# Patient Record
Sex: FEMALE | Race: WHITE | Employment: OTHER | URBAN - METROPOLITAN AREA
[De-identification: names, ages, dates, MRNs, and addresses within clinical notes are randomized per-mention and may not be internally consistent; named-entity substitution may affect disease eponyms.]

---

## 2018-11-19 ENCOUNTER — APPOINTMENT (OUTPATIENT)
Dept: GENERAL RADIOLOGY | Age: 83
End: 2018-11-19
Payer: MEDICARE

## 2018-11-19 ENCOUNTER — HOSPITAL ENCOUNTER (INPATIENT)
Age: 83
LOS: 10 days | Discharge: NURSING, REHAB OR LONG-TERM CARE | DRG: 025 | End: 2018-11-29
Attending: FAMILY MEDICINE | Admitting: INTERNAL MEDICINE
Payer: MEDICARE

## 2018-11-19 ENCOUNTER — HOSPITAL ENCOUNTER (EMERGENCY)
Age: 83
Discharge: ANOTHER HOSPITAL | End: 2018-11-19
Attending: EMERGENCY MEDICINE
Payer: MEDICARE

## 2018-11-19 ENCOUNTER — HOSPITAL ENCOUNTER (EMERGENCY)
Age: 83
DRG: 025 | End: 2018-11-19
Attending: FAMILY MEDICINE
Payer: MEDICARE

## 2018-11-19 ENCOUNTER — APPOINTMENT (OUTPATIENT)
Dept: CT IMAGING | Age: 83
End: 2018-11-19
Payer: MEDICARE

## 2018-11-19 VITALS
HEIGHT: 66 IN | OXYGEN SATURATION: 96 % | TEMPERATURE: 98.1 F | BODY MASS INDEX: 27.32 KG/M2 | SYSTOLIC BLOOD PRESSURE: 156 MMHG | WEIGHT: 170 LBS | HEART RATE: 90 BPM | RESPIRATION RATE: 20 BRPM | DIASTOLIC BLOOD PRESSURE: 95 MMHG

## 2018-11-19 DIAGNOSIS — S06.5XAA BILATERAL SUBDURAL HEMATOMAS: ICD-10-CM

## 2018-11-19 DIAGNOSIS — R41.82 ALTERED MENTAL STATUS, UNSPECIFIED ALTERED MENTAL STATUS TYPE: Primary | ICD-10-CM

## 2018-11-19 DIAGNOSIS — R29.6 FREQUENT FALLS: ICD-10-CM

## 2018-11-19 DIAGNOSIS — S06.5XAA SUBDURAL HEMATOMA: Primary | ICD-10-CM

## 2018-11-19 LAB
A/G RATIO: 1.4 (ref 1.1–2.2)
ALBUMIN SERPL-MCNC: 4 G/DL (ref 3.4–5)
ALP BLD-CCNC: 134 U/L (ref 40–129)
ALT SERPL-CCNC: 21 U/L (ref 10–40)
ANION GAP SERPL CALCULATED.3IONS-SCNC: 10 MMOL/L (ref 3–16)
AST SERPL-CCNC: 31 U/L (ref 15–37)
BASOPHILS ABSOLUTE: 0.1 K/UL (ref 0–0.2)
BASOPHILS RELATIVE PERCENT: 0.9 %
BILIRUB SERPL-MCNC: 0.7 MG/DL (ref 0–1)
BILIRUBIN URINE: NEGATIVE
BLOOD, URINE: NEGATIVE
BUN BLDV-MCNC: 22 MG/DL (ref 7–20)
CALCIUM SERPL-MCNC: 10 MG/DL (ref 8.3–10.6)
CHLORIDE BLD-SCNC: 101 MMOL/L (ref 99–110)
CLARITY: CLEAR
CO2: 32 MMOL/L (ref 21–32)
COLOR: YELLOW
CREAT SERPL-MCNC: 0.9 MG/DL (ref 0.6–1.2)
EOSINOPHILS ABSOLUTE: 0.1 K/UL (ref 0–0.6)
EOSINOPHILS RELATIVE PERCENT: 1.8 %
GFR AFRICAN AMERICAN: >60
GFR NON-AFRICAN AMERICAN: 59
GLOBULIN: 2.9 G/DL
GLUCOSE BLD-MCNC: 102 MG/DL (ref 70–99)
GLUCOSE URINE: NEGATIVE MG/DL
HCT VFR BLD CALC: 41.8 % (ref 36–48)
HEMOGLOBIN: 13.6 G/DL (ref 12–16)
INR BLD: 1.18 (ref 0.86–1.14)
KETONES, URINE: NEGATIVE MG/DL
LACTIC ACID, SEPSIS: 1.3 MMOL/L (ref 0.4–1.9)
LEUKOCYTE ESTERASE, URINE: NEGATIVE
LYMPHOCYTES ABSOLUTE: 1.2 K/UL (ref 1–5.1)
LYMPHOCYTES RELATIVE PERCENT: 15.5 %
MCH RBC QN AUTO: 28.7 PG (ref 26–34)
MCHC RBC AUTO-ENTMCNC: 32.6 G/DL (ref 31–36)
MCV RBC AUTO: 88.1 FL (ref 80–100)
MICROSCOPIC EXAMINATION: NORMAL
MONOCYTES ABSOLUTE: 0.8 K/UL (ref 0–1.3)
MONOCYTES RELATIVE PERCENT: 10.7 %
NEUTROPHILS ABSOLUTE: 5.4 K/UL (ref 1.7–7.7)
NEUTROPHILS RELATIVE PERCENT: 71.1 %
NITRITE, URINE: NEGATIVE
PDW BLD-RTO: 17.2 % (ref 12.4–15.4)
PH UA: 6.5
PLATELET # BLD: 234 K/UL (ref 135–450)
PMV BLD AUTO: 8.5 FL (ref 5–10.5)
POTASSIUM SERPL-SCNC: 4.2 MMOL/L (ref 3.5–5.1)
PRO-BNP: 445 PG/ML (ref 0–449)
PROTEIN UA: NEGATIVE MG/DL
PROTHROMBIN TIME: 13.5 SEC (ref 9.8–13)
RBC # BLD: 4.74 M/UL (ref 4–5.2)
SODIUM BLD-SCNC: 143 MMOL/L (ref 136–145)
SPECIFIC GRAVITY UA: 1.01
TOTAL PROTEIN: 6.9 G/DL (ref 6.4–8.2)
TROPONIN: <0.01 NG/ML
URINE REFLEX TO CULTURE: NORMAL
URINE TYPE: NORMAL
UROBILINOGEN, URINE: 0.2 E.U./DL
WBC # BLD: 7.6 K/UL (ref 4–11)

## 2018-11-19 PROCEDURE — 83880 ASSAY OF NATRIURETIC PEPTIDE: CPT

## 2018-11-19 PROCEDURE — 36415 COLL VENOUS BLD VENIPUNCTURE: CPT

## 2018-11-19 PROCEDURE — 80053 COMPREHEN METABOLIC PANEL: CPT

## 2018-11-19 PROCEDURE — 87040 BLOOD CULTURE FOR BACTERIA: CPT

## 2018-11-19 PROCEDURE — 81003 URINALYSIS AUTO W/O SCOPE: CPT

## 2018-11-19 PROCEDURE — 51702 INSERT TEMP BLADDER CATH: CPT

## 2018-11-19 PROCEDURE — 99291 CRITICAL CARE FIRST HOUR: CPT

## 2018-11-19 PROCEDURE — 84484 ASSAY OF TROPONIN QUANT: CPT

## 2018-11-19 PROCEDURE — 2000000000 HC ICU R&B

## 2018-11-19 PROCEDURE — 85025 COMPLETE CBC W/AUTO DIFF WBC: CPT

## 2018-11-19 PROCEDURE — 71046 X-RAY EXAM CHEST 2 VIEWS: CPT

## 2018-11-19 PROCEDURE — 83605 ASSAY OF LACTIC ACID: CPT

## 2018-11-19 PROCEDURE — 93005 ELECTROCARDIOGRAM TRACING: CPT | Performed by: NURSE PRACTITIONER

## 2018-11-19 PROCEDURE — 85610 PROTHROMBIN TIME: CPT

## 2018-11-19 PROCEDURE — 70450 CT HEAD/BRAIN W/O DYE: CPT

## 2018-11-19 RX ORDER — M-VIT,TX,IRON,MINS/CALC/FOLIC 27MG-0.4MG
1 TABLET ORAL DAILY
COMMUNITY
End: 2019-07-29 | Stop reason: CLARIF

## 2018-11-19 RX ORDER — METOPROLOL SUCCINATE 50 MG/1
50 TABLET, EXTENDED RELEASE ORAL DAILY
Status: ON HOLD | COMMUNITY
End: 2018-11-29 | Stop reason: HOSPADM

## 2018-11-19 RX ORDER — FUROSEMIDE 20 MG/1
20 TABLET ORAL DAILY
Status: ON HOLD | COMMUNITY
End: 2018-11-29 | Stop reason: HOSPADM

## 2018-11-19 RX ORDER — LEVOTHYROXINE SODIUM 0.15 MG/1
150 TABLET ORAL DAILY
COMMUNITY
End: 2019-07-29 | Stop reason: CLARIF

## 2018-11-19 RX ORDER — NYSTATIN 100000 [USP'U]/G
POWDER TOPICAL 4 TIMES DAILY
Status: ON HOLD | COMMUNITY
End: 2019-08-01 | Stop reason: HOSPADM

## 2018-11-19 RX ORDER — PHENOL 1.4 %
2.5 AEROSOL, SPRAY (ML) MUCOUS MEMBRANE DAILY
COMMUNITY
End: 2019-07-29 | Stop reason: CLARIF

## 2018-11-19 RX ORDER — ERTAPENEM 1 G/1
1000 INJECTION, POWDER, LYOPHILIZED, FOR SOLUTION INTRAMUSCULAR; INTRAVENOUS EVERY 24 HOURS
Status: ON HOLD | COMMUNITY
End: 2018-11-29 | Stop reason: HOSPADM

## 2018-11-19 RX ORDER — CLOPIDOGREL BISULFATE 75 MG/1
75 TABLET ORAL DAILY
Status: ON HOLD | COMMUNITY
End: 2018-11-29 | Stop reason: HOSPADM

## 2018-11-20 ENCOUNTER — APPOINTMENT (OUTPATIENT)
Dept: GENERAL RADIOLOGY | Age: 83
DRG: 025 | End: 2018-11-20
Attending: FAMILY MEDICINE
Payer: MEDICARE

## 2018-11-20 ENCOUNTER — APPOINTMENT (OUTPATIENT)
Dept: CT IMAGING | Age: 83
DRG: 025 | End: 2018-11-20
Attending: FAMILY MEDICINE
Payer: MEDICARE

## 2018-11-20 LAB
ABO/RH: NORMAL
ALBUMIN SERPL-MCNC: 3.7 G/DL (ref 3.4–5)
ANION GAP SERPL CALCULATED.3IONS-SCNC: 12 MMOL/L (ref 3–16)
ANTIBODY SCREEN: NORMAL
BASOPHILS ABSOLUTE: 0.1 K/UL (ref 0–0.2)
BASOPHILS RELATIVE PERCENT: 0.9 %
BLOOD BANK DISPENSE STATUS: NORMAL
BLOOD BANK PRODUCT CODE: NORMAL
BPU ID: NORMAL
BUN BLDV-MCNC: 21 MG/DL (ref 7–20)
CALCIUM SERPL-MCNC: 9.2 MG/DL (ref 8.3–10.6)
CHLORIDE BLD-SCNC: 104 MMOL/L (ref 99–110)
CO2: 26 MMOL/L (ref 21–32)
COLLAGEN ADENOSINE-5'-DIPHOSPHATE (ADP) TIME: 107 SEC (ref 56–110)
COLLAGEN EPINEPHRINE TIME: 227 SEC (ref 86–194)
CREAT SERPL-MCNC: 0.7 MG/DL (ref 0.6–1.2)
DESCRIPTION BLOOD BANK: NORMAL
EOSINOPHILS ABSOLUTE: 0.1 K/UL (ref 0–0.6)
EOSINOPHILS RELATIVE PERCENT: 1.3 %
GFR AFRICAN AMERICAN: >60
GFR NON-AFRICAN AMERICAN: >60
GLUCOSE BLD-MCNC: 115 MG/DL (ref 70–99)
HCT VFR BLD CALC: 38.5 % (ref 36–48)
HEMOGLOBIN: 12.6 G/DL (ref 12–16)
INR BLD: 1.17 (ref 0.86–1.14)
LYMPHOCYTES ABSOLUTE: 1 K/UL (ref 1–5.1)
LYMPHOCYTES RELATIVE PERCENT: 15.2 %
MAGNESIUM: 2 MG/DL (ref 1.8–2.4)
MCH RBC QN AUTO: 28.5 PG (ref 26–34)
MCHC RBC AUTO-ENTMCNC: 32.6 G/DL (ref 31–36)
MCV RBC AUTO: 87.2 FL (ref 80–100)
MONOCYTES ABSOLUTE: 0.6 K/UL (ref 0–1.3)
MONOCYTES RELATIVE PERCENT: 9.5 %
NEUTROPHILS ABSOLUTE: 4.8 K/UL (ref 1.7–7.7)
NEUTROPHILS RELATIVE PERCENT: 73.1 %
PDW BLD-RTO: 17.2 % (ref 12.4–15.4)
PHOSPHORUS: 2.4 MG/DL (ref 2.5–4.9)
PLATELET # BLD: 235 K/UL (ref 135–450)
PLATELET FUNCTION INTERPRETATION: ABNORMAL
PMV BLD AUTO: 8.2 FL (ref 5–10.5)
POTASSIUM SERPL-SCNC: 4.3 MMOL/L (ref 3.5–5.1)
PROTHROMBIN TIME: 13.3 SEC (ref 9.8–13)
RBC # BLD: 4.42 M/UL (ref 4–5.2)
SODIUM BLD-SCNC: 142 MMOL/L (ref 136–145)
WBC # BLD: 6.6 K/UL (ref 4–11)

## 2018-11-20 PROCEDURE — 36415 COLL VENOUS BLD VENIPUNCTURE: CPT

## 2018-11-20 PROCEDURE — 83735 ASSAY OF MAGNESIUM: CPT

## 2018-11-20 PROCEDURE — 86850 RBC ANTIBODY SCREEN: CPT

## 2018-11-20 PROCEDURE — 85576 BLOOD PLATELET AGGREGATION: CPT

## 2018-11-20 PROCEDURE — 80069 RENAL FUNCTION PANEL: CPT

## 2018-11-20 PROCEDURE — 2000000000 HC ICU R&B

## 2018-11-20 PROCEDURE — 70450 CT HEAD/BRAIN W/O DYE: CPT

## 2018-11-20 PROCEDURE — 86901 BLOOD TYPING SEROLOGIC RH(D): CPT

## 2018-11-20 PROCEDURE — 86900 BLOOD TYPING SEROLOGIC ABO: CPT

## 2018-11-20 PROCEDURE — 6360000002 HC RX W HCPCS: Performed by: INTERNAL MEDICINE

## 2018-11-20 PROCEDURE — 6360000002 HC RX W HCPCS: Performed by: NURSE PRACTITIONER

## 2018-11-20 PROCEDURE — 85610 PROTHROMBIN TIME: CPT

## 2018-11-20 PROCEDURE — 36430 TRANSFUSION BLD/BLD COMPNT: CPT

## 2018-11-20 PROCEDURE — 93010 ELECTROCARDIOGRAM REPORT: CPT | Performed by: INTERNAL MEDICINE

## 2018-11-20 PROCEDURE — 6360000002 HC RX W HCPCS: Performed by: STUDENT IN AN ORGANIZED HEALTH CARE EDUCATION/TRAINING PROGRAM

## 2018-11-20 PROCEDURE — 87040 BLOOD CULTURE FOR BACTERIA: CPT

## 2018-11-20 PROCEDURE — 85025 COMPLETE CBC W/AUTO DIFF WBC: CPT

## 2018-11-20 PROCEDURE — 71045 X-RAY EXAM CHEST 1 VIEW: CPT

## 2018-11-20 PROCEDURE — 2580000003 HC RX 258: Performed by: STUDENT IN AN ORGANIZED HEALTH CARE EDUCATION/TRAINING PROGRAM

## 2018-11-20 PROCEDURE — P9035 PLATELET PHERES LEUKOREDUCED: HCPCS

## 2018-11-20 PROCEDURE — 6370000000 HC RX 637 (ALT 250 FOR IP): Performed by: STUDENT IN AN ORGANIZED HEALTH CARE EDUCATION/TRAINING PROGRAM

## 2018-11-20 RX ORDER — ONDANSETRON 2 MG/ML
4 INJECTION INTRAMUSCULAR; INTRAVENOUS EVERY 6 HOURS PRN
Status: DISCONTINUED | OUTPATIENT
Start: 2018-11-20 | End: 2018-11-29 | Stop reason: HOSPADM

## 2018-11-20 RX ORDER — SODIUM CHLORIDE 0.9 % (FLUSH) 0.9 %
10 SYRINGE (ML) INJECTION EVERY 12 HOURS SCHEDULED
Status: DISCONTINUED | OUTPATIENT
Start: 2018-11-20 | End: 2018-11-20

## 2018-11-20 RX ORDER — SODIUM CHLORIDE AND POTASSIUM CHLORIDE .9; .15 G/100ML; G/100ML
SOLUTION INTRAVENOUS CONTINUOUS
Status: DISCONTINUED | OUTPATIENT
Start: 2018-11-20 | End: 2018-11-20

## 2018-11-20 RX ORDER — SODIUM CHLORIDE 9 MG/ML
INJECTION, SOLUTION INTRAVENOUS CONTINUOUS
Status: DISCONTINUED | OUTPATIENT
Start: 2018-11-20 | End: 2018-11-22

## 2018-11-20 RX ORDER — HYDROCHLOROTHIAZIDE 25 MG/1
25 TABLET ORAL DAILY
Status: DISCONTINUED | OUTPATIENT
Start: 2018-11-20 | End: 2018-11-20

## 2018-11-20 RX ORDER — MAGNESIUM SULFATE 1 G/100ML
1 INJECTION INTRAVENOUS PRN
Status: DISCONTINUED | OUTPATIENT
Start: 2018-11-20 | End: 2018-11-21

## 2018-11-20 RX ORDER — 0.9 % SODIUM CHLORIDE 0.9 %
250 INTRAVENOUS SOLUTION INTRAVENOUS ONCE
Status: COMPLETED | OUTPATIENT
Start: 2018-11-20 | End: 2018-11-20

## 2018-11-20 RX ORDER — METOPROLOL SUCCINATE 50 MG/1
50 TABLET, EXTENDED RELEASE ORAL DAILY
Status: DISCONTINUED | OUTPATIENT
Start: 2018-11-20 | End: 2018-11-22

## 2018-11-20 RX ORDER — LIDOCAINE HYDROCHLORIDE 10 MG/ML
5 INJECTION, SOLUTION EPIDURAL; INFILTRATION; INTRACAUDAL; PERINEURAL ONCE
Status: DISCONTINUED | OUTPATIENT
Start: 2018-11-20 | End: 2018-11-21

## 2018-11-20 RX ORDER — ACETAMINOPHEN 325 MG/1
650 TABLET ORAL EVERY 4 HOURS PRN
Status: DISCONTINUED | OUTPATIENT
Start: 2018-11-20 | End: 2018-11-29 | Stop reason: HOSPADM

## 2018-11-20 RX ORDER — SODIUM CHLORIDE 0.9 % (FLUSH) 0.9 %
10 SYRINGE (ML) INJECTION PRN
Status: DISCONTINUED | OUTPATIENT
Start: 2018-11-20 | End: 2018-11-29 | Stop reason: HOSPADM

## 2018-11-20 RX ORDER — LEVOTHYROXINE SODIUM 0.15 MG/1
150 TABLET ORAL DAILY
Status: DISCONTINUED | OUTPATIENT
Start: 2018-11-20 | End: 2018-11-29 | Stop reason: HOSPADM

## 2018-11-20 RX ORDER — ATORVASTATIN CALCIUM 20 MG/1
20 TABLET, FILM COATED ORAL DAILY
Status: DISCONTINUED | OUTPATIENT
Start: 2018-11-20 | End: 2018-11-29 | Stop reason: HOSPADM

## 2018-11-20 RX ORDER — SODIUM CHLORIDE 0.9 % (FLUSH) 0.9 %
10 SYRINGE (ML) INJECTION EVERY 12 HOURS SCHEDULED
Status: DISCONTINUED | OUTPATIENT
Start: 2018-11-20 | End: 2018-11-29 | Stop reason: HOSPADM

## 2018-11-20 RX ORDER — POTASSIUM CHLORIDE AND SODIUM CHLORIDE 450; 150 MG/100ML; MG/100ML
INJECTION, SOLUTION INTRAVENOUS CONTINUOUS
Status: DISCONTINUED | OUTPATIENT
Start: 2018-11-20 | End: 2018-11-20

## 2018-11-20 RX ORDER — SODIUM CHLORIDE 0.9 % (FLUSH) 0.9 %
10 SYRINGE (ML) INJECTION PRN
Status: DISCONTINUED | OUTPATIENT
Start: 2018-11-20 | End: 2018-11-20

## 2018-11-20 RX ADMIN — LEVOTHYROXINE SODIUM 150 MCG: 150 TABLET ORAL at 06:40

## 2018-11-20 RX ADMIN — DESMOPRESSIN ACETATE 23.12 MCG: 4 INJECTION INTRAVENOUS at 02:49

## 2018-11-20 RX ADMIN — POTASSIUM CHLORIDE AND SODIUM CHLORIDE: 450; 150 INJECTION, SOLUTION INTRAVENOUS at 12:39

## 2018-11-20 RX ADMIN — SODIUM CHLORIDE 250 ML: 9 INJECTION, SOLUTION INTRAVENOUS at 05:28

## 2018-11-20 RX ADMIN — SODIUM CHLORIDE: 9 INJECTION, SOLUTION INTRAVENOUS at 23:23

## 2018-11-20 RX ADMIN — POTASSIUM CHLORIDE AND SODIUM CHLORIDE: 900; 150 INJECTION, SOLUTION INTRAVENOUS at 16:44

## 2018-11-20 ASSESSMENT — ENCOUNTER SYMPTOMS
PHOTOPHOBIA: 0
GASTROINTESTINAL NEGATIVE: 1
RESPIRATORY NEGATIVE: 1

## 2018-11-20 ASSESSMENT — PAIN DESCRIPTION - PROGRESSION
CLINICAL_PROGRESSION: NOT CHANGED

## 2018-11-20 ASSESSMENT — PAIN SCALES - GENERAL
PAINLEVEL_OUTOF10: 0
PAINLEVEL_OUTOF10: 5

## 2018-11-20 ASSESSMENT — PAIN DESCRIPTION - LOCATION: LOCATION: HEAD

## 2018-11-20 ASSESSMENT — PAIN DESCRIPTION - PAIN TYPE: TYPE: ACUTE PAIN

## 2018-11-20 ASSESSMENT — PAIN DESCRIPTION - ONSET: ONSET: ON-GOING

## 2018-11-20 ASSESSMENT — PAIN DESCRIPTION - DESCRIPTORS: DESCRIPTORS: CONSTANT

## 2018-11-20 NOTE — PROGRESS NOTES
Patient a difficult stick. Unable to obtain all of ordered blood work.  Discussed with ICU residents who will attempt

## 2018-11-20 NOTE — ED NOTES
Pt and daughter updated on the plan of care. Daughter leaving for night but can be reached at [phone].      Brunilda Vaughan RN  11/19/18 2392

## 2018-11-20 NOTE — CONSULTS
bilaterally  XII: Tongue midline     Musculoskeletal:   Gait: Not tested   Assist devices: None   Tone: Normal  Motor strength: Pt reports chronic LLE weakness    Right  Left      Right  Left    Deltoid  5 5   Hip Flex  5 4   Biceps  5 5   Knee Extensors  5 4   Triceps  5 5   Knee Flexors  5 4   Wrist Ext  5 5   Ankle Dorsiflex. 5 4   Wrist Flex  5 5   Ankle Plantarflex. 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 Approximately 5 mm of left-to-right shift. Neurosurgical evaluation is recommended.     Ct Head Without Contrast  Result Date: 11/20/2018  1. Reidentified large bilateral subdural fluid collections along both cerebral convexities as well as the bilateral tentorial leaflets and both sides of the falx cerebri. These again measure up to approximately 3.3 cm on the left and 2.9 cm on the right. Exact comparison is difficult due to differences in angulation. There is a component of acute and subacute hemorrhage although the chronic components could represent chronic subdural hematomas, effusions, or hygromas. These collections result in marked compression of the bilateral cerebral hemispheres and unchanged 5 mm rightward midline shift. 2.  Reidentified right frontal approach ventriculostomy catheter with tip terminating in the right frontal horn.   The ventricles are unchanged in size and nondilated.       Xr Shunt Series Placement (<4 Views)  Result Date: 11/20/2018  Right frontal approach  shunt catheter tubing is intact.        Labs:      Recent Labs      11/19/18   1655   WBC  7.6   HGB  13.6   HCT  41.8   PLT  234             Recent Labs      11/19/18   1655   NA  143   K  4.2   CL  101   CO2  32   BUN  22*   CREATININE  0.9   GLUCOSE  102*   CALCIUM  10.0             Recent Labs      11/20/18   0305   PROTIME  13.3*   INR  1.17*

## 2018-11-20 NOTE — PLAN OF CARE
Problem: Falls - Risk of:  Goal: Will remain free from falls  Will remain free from falls   Outcome: Ongoing  Pt is a fall risk. Fall risk protocol in place. See Jenny Trujillo Fall Score. Pt bed is in low position, bed alarm is on, side rails up, fall risk bracelet applied. , non-skid footwear in use. Patient/family educated on fall risk protocol, instructed to call for assistance when needed and belongings are in reach. assistance. Will continue with hourly rounds for po intake, pain needs, toileting and repositioning as needed. Will continue to monitor for needs. Problem: Risk for Impaired Skin Integrity  Goal: Tissue integrity - skin and mucous membranes  Structural intactness and normal physiological function of skin and  mucous membranes. Outcome: Ongoing  Pt at risk for skin breakdown. See Artur score. Pt remains on bedrest. Unable to reposition self in bed. Heels elevated off bed. Sacral heart mepilex intact to protect,  site inspected and intact underneath. Will continue to turn and reposition patient every two hours and as needed. Will continue to keep patient clean and dry, applying skin care cream as needed. Pillows used for repositioning q2hs. Will continue to monitor and assess for skin breakdown.

## 2018-11-20 NOTE — H&P
Hereditary and idiopathic neuropathy     Hydrocephalus     Hyperlipidemia     Hypertension     Localized edema     Muscle weakness (generalized)     Syncope and collapse     Thyroid disease     Unspecified kidney failure     UTI (urinary tract infection)        Past Surgical History:   Procedure Laterality Date    CHOLECYSTECTOMY      CSF SHUNT      HYSTERECTOMY         SocialHistory:   The patient lives at    Alcohol:  Illicit drugs: no use  Tobacco:      Family History:  No family history on file. MEDICATIONS:     No current facility-administered medications on file prior to encounter. Current Outpatient Prescriptions on File Prior to Encounter   Medication Sig Dispense Refill    aspirin 81 MG tablet Take 81 mg by mouth daily      calcium carbonate 600 MG TABS tablet Take 2.5 tablets by mouth daily      furosemide (LASIX) 20 MG tablet Take 20 mg by mouth daily      levothyroxine (SYNTHROID) 150 MCG tablet Take 150 mcg by mouth Daily      Multiple Vitamins-Minerals (THERAPEUTIC MULTIVITAMIN-MINERALS) tablet Take 1 tablet by mouth daily      hydrochlorothiazide (HYDRODIURIL) 25 MG tablet Take 25 mg by mouth daily.  clopidogrel (PLAVIX) 75 MG tablet Take 75 mg by mouth daily      ertapenem (INVANZ) 1 GM injection Inject 1,000 mg into the muscle every 24 hours Until 11/23/2018 for uti      metoprolol succinate (TOPROL XL) 50 MG extended release tablet Take 50 mg by mouth daily      Mirabegron ER 25 MG TB24 Take 25 mg by mouth daily      nystatin (MYCOSTATIN) 908780 UNIT/GM powder Apply topically 4 times daily Apply topically 4 times daily.  atorvastatin (LIPITOR) 20 MG tablet Take 20 mg by mouth daily. Scheduled Meds:   Continuous Infusions:  PRN Meds:    Allergies: No Known Allergies    REVIEW OF SYSTEMS:       History obtained from the patient, daughter and [facility]    Review of Systems   Constitutional: Negative. HENT: Negative.     Eyes: Negative for photophobia and visual disturbance. Respiratory: Negative. Cardiovascular: Negative. Gastrointestinal: Negative. Musculoskeletal: Positive for gait problem. Skin: Negative. Neurological: Positive for headaches. Negative for tremors, seizures, speech difficulty and numbness. PHYSICAL EXAM:       Vitals: /88   Pulse 85   Temp 97.6 °F (36.4 °C) (Axillary)   Resp 16   SpO2 97%     I/O:  No intake or output data in the 24 hours ending 11/20/18 0016  No intake/output data recorded. No intake/output data recorded. Physical Examination:     Physical Exam   Constitutional: She appears well-developed and well-nourished. No distress. HENT:   Head: Normocephalic and atraumatic. Eyes: Pupils are equal, round, and reactive to light. EOM are normal.   Neck: Normal range of motion. Cardiovascular: Normal rate. Irregularly irregular rhythm    Pulmonary/Chest: Effort normal and breath sounds normal. No respiratory distress. Abdominal: Soft. Bowel sounds are normal. She exhibits no distension. There is no tenderness. Musculoskeletal: Normal range of motion. Neurological:   Alert and oriented to person, place and month. No focal neurological deficits noted on exam. Was able to follow commands. 5/5 strength of upper and lower extremities. Skin: Skin is warm and dry. Psychiatric: She has a normal mood and affect. Access:   -Central Access Day #: 0                          -Peripheral Access Day#: 1  -Arterial line Day#: 0                                Salcedo Day#: 0  NGT Day#: 0                                         ETT Day#: 0  Vent Settings:   No results for input(s): PHART, HCU9UTP, PO2ART in the last 72 hours.         DATA:       Labs:  CBC:   Recent Labs      11/19/18   1655   WBC  7.6   HGB  13.6   HCT  41.8   PLT  234       BMP:   Recent Labs      11/19/18   1655   NA  143   K  4.2   CL  101   CO2  32   BUN  22*   CREATININE  0.9   GLUCOSE  102*     LFT's:   Recent Desmopressin and 5 units of platelets in setting of ASA and Plavix use   - Daily CBC, RFP and INR   - Fall precautions in place   - PICC team consulted     #AMS- likely 2/2 AoC SDH. Mentation seems to be improving as patient is alert and oriented to person, place, and month. She has no fever, no leukocytosis and her UA was negative making infectious etiology unlikely.    - See plan above     #Normal pressure hydrocephalus- Patient with history of NPH, underwent shunt placement two years ago due to gait instability  - Neurosurge following    #HTN- Stable  - Maintain SBP < 160  - Cont home meds   HCTZ 25mg daily   Toprol XL 50mg daily     #HLD  - Cont home lipitor 20mg daily    #pA-Fib- Patient's EKG on presentation indicated A-Fib; however rate controlled into the 70s   - Cont home Toprol XL 50mg daily  - Tele     #Hypothyroidism   - Cont home synthroid     #History of UTI- Patient started on daily Ivanz treatment (11/15) for treatment of UTI   - D/ C Ivanz        Code Status: Full  FEN: NPO  PPX:  Hold AC in setting of brain bleed   DISPO: ICU    This patient has been staffed and discussed with Sean Hinton MD.   -----------------------------  Bianca Bhatia MD  Internal Medicine, PGY-1  11/20/2018  12:16 AM

## 2018-11-21 LAB
ALBUMIN SERPL-MCNC: 3.4 G/DL (ref 3.4–5)
ANION GAP SERPL CALCULATED.3IONS-SCNC: 10 MMOL/L (ref 3–16)
BASOPHILS ABSOLUTE: 0.1 K/UL (ref 0–0.2)
BASOPHILS RELATIVE PERCENT: 0.9 %
BUN BLDV-MCNC: 21 MG/DL (ref 7–20)
CALCIUM SERPL-MCNC: 8.8 MG/DL (ref 8.3–10.6)
CHLORIDE BLD-SCNC: 105 MMOL/L (ref 99–110)
CO2: 24 MMOL/L (ref 21–32)
CREAT SERPL-MCNC: 0.5 MG/DL (ref 0.6–1.2)
EOSINOPHILS ABSOLUTE: 0.2 K/UL (ref 0–0.6)
EOSINOPHILS RELATIVE PERCENT: 2.4 %
GFR AFRICAN AMERICAN: >60
GFR NON-AFRICAN AMERICAN: >60
GLUCOSE BLD-MCNC: 97 MG/DL (ref 70–99)
HCT VFR BLD CALC: 37.6 % (ref 36–48)
HEMOGLOBIN: 12.4 G/DL (ref 12–16)
LYMPHOCYTES ABSOLUTE: 1.2 K/UL (ref 1–5.1)
LYMPHOCYTES RELATIVE PERCENT: 17.7 %
MAGNESIUM: 1.9 MG/DL (ref 1.8–2.4)
MCH RBC QN AUTO: 28.5 PG (ref 26–34)
MCHC RBC AUTO-ENTMCNC: 33 G/DL (ref 31–36)
MCV RBC AUTO: 86.3 FL (ref 80–100)
MONOCYTES ABSOLUTE: 0.7 K/UL (ref 0–1.3)
MONOCYTES RELATIVE PERCENT: 9.9 %
NEUTROPHILS ABSOLUTE: 4.7 K/UL (ref 1.7–7.7)
NEUTROPHILS RELATIVE PERCENT: 69.1 %
PDW BLD-RTO: 17.3 % (ref 12.4–15.4)
PHOSPHORUS: 2.4 MG/DL (ref 2.5–4.9)
PLATELET # BLD: 266 K/UL (ref 135–450)
PMV BLD AUTO: 8.7 FL (ref 5–10.5)
POTASSIUM SERPL-SCNC: 4.6 MMOL/L (ref 3.5–5.1)
RBC # BLD: 4.35 M/UL (ref 4–5.2)
SODIUM BLD-SCNC: 139 MMOL/L (ref 136–145)
WBC # BLD: 6.7 K/UL (ref 4–11)

## 2018-11-21 PROCEDURE — 97535 SELF CARE MNGMENT TRAINING: CPT

## 2018-11-21 PROCEDURE — 85025 COMPLETE CBC W/AUTO DIFF WBC: CPT

## 2018-11-21 PROCEDURE — 6360000002 HC RX W HCPCS: Performed by: NURSE PRACTITIONER

## 2018-11-21 PROCEDURE — 2580000003 HC RX 258: Performed by: STUDENT IN AN ORGANIZED HEALTH CARE EDUCATION/TRAINING PROGRAM

## 2018-11-21 PROCEDURE — 1200000000 HC SEMI PRIVATE

## 2018-11-21 PROCEDURE — G8996 SWALLOW CURRENT STATUS: HCPCS

## 2018-11-21 PROCEDURE — 83735 ASSAY OF MAGNESIUM: CPT

## 2018-11-21 PROCEDURE — G8987 SELF CARE CURRENT STATUS: HCPCS

## 2018-11-21 PROCEDURE — 80069 RENAL FUNCTION PANEL: CPT

## 2018-11-21 PROCEDURE — 97163 PT EVAL HIGH COMPLEX 45 MIN: CPT

## 2018-11-21 PROCEDURE — 6370000000 HC RX 637 (ALT 250 FOR IP): Performed by: STUDENT IN AN ORGANIZED HEALTH CARE EDUCATION/TRAINING PROGRAM

## 2018-11-21 PROCEDURE — G8978 MOBILITY CURRENT STATUS: HCPCS

## 2018-11-21 PROCEDURE — G8988 SELF CARE GOAL STATUS: HCPCS

## 2018-11-21 PROCEDURE — 97530 THERAPEUTIC ACTIVITIES: CPT

## 2018-11-21 PROCEDURE — 6370000000 HC RX 637 (ALT 250 FOR IP): Performed by: NURSE PRACTITIONER

## 2018-11-21 PROCEDURE — G8979 MOBILITY GOAL STATUS: HCPCS

## 2018-11-21 PROCEDURE — 97167 OT EVAL HIGH COMPLEX 60 MIN: CPT

## 2018-11-21 PROCEDURE — 92610 EVALUATE SWALLOWING FUNCTION: CPT

## 2018-11-21 PROCEDURE — G8997 SWALLOW GOAL STATUS: HCPCS

## 2018-11-21 RX ORDER — DEXAMETHASONE 4 MG/1
2 TABLET ORAL EVERY 12 HOURS SCHEDULED
Status: DISCONTINUED | OUTPATIENT
Start: 2018-11-21 | End: 2018-11-27

## 2018-11-21 RX ORDER — QUETIAPINE FUMARATE 25 MG/1
12.5 TABLET, FILM COATED ORAL NIGHTLY
Status: DISCONTINUED | OUTPATIENT
Start: 2018-11-21 | End: 2018-11-24

## 2018-11-21 RX ADMIN — Medication 10 ML: at 15:28

## 2018-11-21 RX ADMIN — LEVOTHYROXINE SODIUM 150 MCG: 150 TABLET ORAL at 08:43

## 2018-11-21 RX ADMIN — ATORVASTATIN CALCIUM 20 MG: 20 TABLET, FILM COATED ORAL at 08:43

## 2018-11-21 RX ADMIN — Medication 10 ML: at 21:00

## 2018-11-21 RX ADMIN — DEXAMETHASONE 2 MG: 4 TABLET ORAL at 08:44

## 2018-11-21 RX ADMIN — SODIUM CHLORIDE: 9 INJECTION, SOLUTION INTRAVENOUS at 15:04

## 2018-11-21 RX ADMIN — DEXAMETHASONE 2 MG: 4 TABLET ORAL at 21:00

## 2018-11-21 RX ADMIN — METOPROLOL SUCCINATE 50 MG: 50 TABLET, EXTENDED RELEASE ORAL at 08:43

## 2018-11-21 RX ADMIN — QUETIAPINE FUMARATE 12.5 MG: 25 TABLET ORAL at 21:00

## 2018-11-21 ASSESSMENT — PAIN SCALES - GENERAL
PAINLEVEL_OUTOF10: 0

## 2018-11-21 NOTE — DISCHARGE SUMMARY
carbonate 600 MG Tabs tablet     clopidogrel 75 MG tablet  Commonly known as:  PLAVIX     furosemide 20 MG tablet  Commonly known as:  LASIX     hydrochlorothiazide 25 MG tablet  Commonly known as:  HYDRODIURIL     INVANZ 1 GM injection  Generic drug:  ertapenem     levothyroxine 150 MCG tablet  Commonly known as:  SYNTHROID     LIPITOR 20 MG tablet  Generic drug:  atorvastatin     metoprolol succinate 50 MG extended release tablet  Commonly known as:  TOPROL XL     Mirabegron ER 25 MG Tb24     nystatin 890172 UNIT/GM powder  Commonly known as:  MYCOSTATIN     therapeutic multivitamin-minerals tablet          Activity: activity as tolerated  Diet: regular diet  Wound Care: keep wound clean and dry    Time Spent on discharge is more than 30 minutes    Signed:  Hugo Meyer MD   Internal Medicine  11/21/2018  Patient seen and examined, labs and imaging studies reviewed, agree with assessment and plan as outlined above. Continue with discharge planning asked to monitor for recurrence of symptoms or new symptoms including but not limited to chest pain shortness of breath nausea vomiting fevers or chills and seek immediate medical attention or call 911. Greater than 30 minutes spent on case on day of discharge.  Late entry on 11/30    Fermín Yanes MD FACP

## 2018-11-21 NOTE — PROGRESS NOTES
Hyperlipidemia, Syncope and Collapse, UTI, Julianne, CSF Shunt. Family / Caregiver Present: No  Pain Assessment  Patient Currently in Pain: Denies (no indications of pain)       Social/Functional History  Social/Functional History  Lives With: Alone (NOTE: pt poor historian and unable to provide information; phoned dtr, Fabio Lynch for PLOF information as below: )  Type of Home: Assisted living ([address])  Home Equipment: Rolling walker  Receives Help From:  (nurses monitor medication management)  ADL Assistance: Needs assistance (independent - dressing; assist w/ showers)  Homemaking Assistance: Needs assistance  Ambulation Assistance: Independent (ambulates to dining hardin)  Transfer Assistance: Independent  Active : No  Additional Comments: over past 5 months (since  (caregiver) passed away - went into AL upon his passing); over 1 week ago - dx w/ UTI (was treated for 4-5 days - despite UTI treatment)       Objective   Vision: Impaired (wears glasses)  Hearing: Within functional limits (appears [place] but difficult to assess)    Orientation  Overall Orientation Status: Impaired  Orientation Level:  (responds to name (unable to state birth year))     Balance  Sitting Balance:  (x 15 minutes; at times CGA for unsupported sitting, difficulty assuming midline position (Mod A physical prompting to obtain midline, unable to assume/maintain w/o physical assist))  Standing Balance: Dependent/Total (Mod A of 2 (static at walker - lean to R))    ADL  Grooming: Maximum assistance (to wipe drool from mouth; assist to don glasses - decreased accuracy)  LE Dressing: Dependent/Total  Toileting: Dependent/Total  Tone RUE  RUE Tone: Normotonic  Tone LUE  LUE Tone: Normotonic  Coordination  Coordination and Movement description: Apraxia; Right UE;Left UE;Decreased speed;Decreased accuracy; Fine motor impairments;Gross motor impairments  Quality of Movement Other  Comment: not following finger opposition

## 2018-11-21 NOTE — CONSULTS
 Hereditary and idiopathic neuropathy      Hydrocephalus      Hyperlipidemia      Hypertension      Localized edema      Muscle weakness (generalized)      Syncope and collapse      Thyroid disease      Unspecified kidney failure      UTI (urinary tract infection)              Past Surgical History         Past Surgical History:   Procedure Laterality Date    CHOLECYSTECTOMY        CSF SHUNT        HYSTERECTOMY                Social History          Occupational History    Not on file.           Social History Main Topics    Smoking status: Never Smoker    Smokeless tobacco: Never Used    Alcohol use No    Drug use: No    Sexual activity: Not on file         Family History   No family history on file.         Active Medications   Outpatient Prescriptions Marked as Taking for the 11/19/18 encounter [facility] Encounter)   Medication Sig Dispense Refill    aspirin 81 MG tablet Take 81 mg by mouth daily        calcium carbonate 600 MG TABS tablet Take 2.5 tablets by mouth daily        furosemide (LASIX) 20 MG tablet Take 20 mg by mouth daily        levothyroxine (SYNTHROID) 150 MCG tablet Take 150 mcg by mouth Daily        Multiple Vitamins-Minerals (THERAPEUTIC MULTIVITAMIN-MINERALS) tablet Take 1 tablet by mouth daily        hydrochlorothiazide (HYDRODIURIL) 25 MG tablet Take 25 mg by mouth daily.                Current Facility-Administered Medications             Current Facility-Administered Medications   Medication Dose Route Frequency Provider Last Rate Last Dose    atorvastatin (LIPITOR) tablet 20 mg  20 mg Oral Daily Eliane Miller MD        hydrochlorothiazide (HYDRODIURIL) tablet 25 mg  25 mg Oral Daily Eliane Miller MD        levothyroxine (SYNTHROID) tablet 150 mcg  150 mcg Oral Daily Eliane Miller MD   150 mcg at 11/20/18 0640    metoprolol succinate (TOPROL XL) extended release tablet 50 mg  50 mg Oral Daily Eliane Miller MD        sodium chloride flush 0.9 % bilaterally  XII: Tongue midline     Musculoskeletal:   Gait: Not tested   Assist devices: None   Tone: Normal  Motor strength: Pt reports chronic LLE weakness    Right  Left      Right  Left    Deltoid  5 5   Hip Flex  5 4   Biceps  5 5   Knee Extensors  5 4   Triceps  5 5   Knee Flexors  5 4   Wrist Ext  5 5   Ankle Dorsiflex. 5 4   Wrist Flex  5 5   Ankle Plantarflex. 5 4   Handgrip  5 5   Ext Geo Longus  5 4   Thumb Ext  5 5              Radiological Findings:    I personally reviewed the patient's imaging which consists of CTs of the brain dated 11/19/18 and 11/20/18. These demonstrate large bilateral mixed density subdural hematomas measuring 3.3 and 2.9 cm in greatest dimension. There is underlying compression of the parenchyma bilaterally.        Labs:      Recent Labs      11/19/18   1655   WBC  7.6   HGB  13.6   HCT  41.8   PLT  234             Recent Labs      11/19/18   1655   NA  143   K  4.2   CL  101   CO2  32   BUN  22*   CREATININE  0.9   GLUCOSE  102*   CALCIUM  10.0             Recent Labs      11/20/18   0305   PROTIME  13.3*   INR  1.17*              Patient Active Problem List     Diagnosis Date Noted    Subdural hematoma ([facility] [address] 11/19/2018         Assessment:  80 y.o. female w/stable large bilateral a/cSDH, on Plavix    Plan:  3. Frequent neuro checks   4. Follow up head CT stable. No further imaging unless there is a decline in neurologic exam  5. Decadron 2 mg Q12H  6. Lipitor 20 mg Daily  7. Maintain SBP <160; If PRN med insufficient, then may start Nicardipine infusion  8. Keep Plt >100k & INR <1.4  9. Hold all anticoagulation & antiplatelet for 2 weeks  10. SCDs for DVT prophylaxis  11. Keep HOB >30 degrees  12.  Shunt: Current setting is 180. Will reset to 200.   13. Will likely require evacuation of SDH next week after Plavix effect wears off.      Priyanka Villegas MD, PhD  [address], [address] [place], [place], [address] [phone] (c), [phone] (o)

## 2018-11-21 NOTE — PROGRESS NOTES
Speech Language Pathology  Facility/Department: [facility] ICU   BEDSIDE SWALLOW EVALUATION    NAME: Jessie Lombardo  : 1933  MRN: [id number]    ADMISSION DATE: 2018  ADMITTING DIAGNOSIS: has Subdural hematoma ([address] on her problem list.  ONSET DATE:  18    Recent Chest Xray/CT of Chest: none    CT head: 18  1.  Reidentified large bilateral subdural fluid collections along both    cerebral convexities as well as the bilateral tentorial leaflets and both    sides of the falx cerebri.  These again measure up to approximately 3.3    cm on the left and 2.9 cm on the right.  Exact comparison is difficult    due to differences in angulation.  There is a component of acute and    subacute hemorrhage although the chronic components could represent    chronic subdural hematomas, effusions, or hygromas.  These collections    result in marked compression of the bilateral cerebral hemispheres and    unchanged 5 mm rightward midline shift. 2.  Reidentified right frontal approach ventriculostomy catheter with tip    terminating in the right frontal horn.  The ventricles are unchanged in    size and nondilated. Date of Eval: 2018  Evaluating Therapist: Gill Adhikari    Current Diet level:  Current Diet : Regular  Current Liquid Diet : Thin      Primary Complaint  Patient Complaint: none    Pain:  Pain Assessment  Patient Currently in Pain: Denies    Reason for Referral  Jessie Lombardo was referred for a bedside swallow evaluation to assess the efficiency of her swallow function, identify signs and symptoms of aspiration and make recommendations regarding safe dietary consistencies, effective compensatory strategies, and safe eating environment. Impression  Dysphagia Diagnosis: Mild to moderate oral stage dysphagia; Suspected needs further assessment.  Deficits appear cognitively related       Neurosurgery note indicated no emergent neurosurgical intervention and nursing reported ok to proceed with assessment. Pt required cueing/assist with feeding (would miss trying to get spoon into applesauce; attempted to drink applesauce). Pt with intermittent prolonged holding and mildly increased mastication time with all consistencies with minimal to no oral residue post po trials of cracker. Pt would need cueing at times to initiate swallow response. Pt with good laryngeal lift felt upon palpation of anterior neck with intermttent delayed swallow response which appeared cogntively related. No cough/wet vocal quality/throat clearing with any po trials of ice chips, thins/nectar via cup, or cracker (cough noted immediately post swallow with thins and nectar via straw with pt). Will initiate modified diet with supervision; pt declines MBS (doesn't want to eat/drink anything for a while and pretty lethargic toward end of session). Will f/u with speech/cognitive evaluation as appropriate in later sessions (pt too lethargic at this time). Dysphagia Impression : Pt oriented to self only, not to place and to year with choices. Pt alert with episodes of intermittent lethargy increasing toward end of session. Dysphagia Outcome Severity Scale: Level 3: Moderate dysphagia- Total assisstance, supervision or strategies. Two or more diet consistencies restricted     Treatment Plan  Requires SLP Intervention: Yes  Duration/Frequency of Treatment: Dysphagia therapy 3-5 times a week  D/C Recommendations: To be determined       Recommended Diet and Intervention  Diet Solids Recommendation: Dysphagia II Mechanically Altered   Liquid Consistency Recommendation: Nectar (NO STRAWS with constant supervision and assist with feeding as needed) - If any s/s of aspiration or if lung status decline emerges, then d/c po until we recheck.   Recommended Form of Meds: Crushed in puree as able  Therapeutic Interventions: Diet tolerance monitoring;Patient/Family education    Compensatory Swallowing

## 2018-11-22 ENCOUNTER — APPOINTMENT (OUTPATIENT)
Dept: CT IMAGING | Age: 83
DRG: 025 | End: 2018-11-22
Attending: FAMILY MEDICINE
Payer: MEDICARE

## 2018-11-22 LAB
ALBUMIN SERPL-MCNC: 3.7 G/DL (ref 3.4–5)
ANION GAP SERPL CALCULATED.3IONS-SCNC: 14 MMOL/L (ref 3–16)
BASOPHILS ABSOLUTE: 0 K/UL (ref 0–0.2)
BASOPHILS RELATIVE PERCENT: 0.3 %
BUN BLDV-MCNC: 18 MG/DL (ref 7–20)
CALCIUM SERPL-MCNC: 8.9 MG/DL (ref 8.3–10.6)
CHLORIDE BLD-SCNC: 107 MMOL/L (ref 99–110)
CO2: 18 MMOL/L (ref 21–32)
CREAT SERPL-MCNC: <0.5 MG/DL (ref 0.6–1.2)
EOSINOPHILS ABSOLUTE: 0 K/UL (ref 0–0.6)
EOSINOPHILS RELATIVE PERCENT: 0 %
GFR AFRICAN AMERICAN: >60
GFR NON-AFRICAN AMERICAN: >60
GLUCOSE BLD-MCNC: 96 MG/DL (ref 70–99)
HCT VFR BLD CALC: 42.6 % (ref 36–48)
HEMOGLOBIN: 13.6 G/DL (ref 12–16)
INR BLD: 1.19 (ref 0.86–1.14)
LYMPHOCYTES ABSOLUTE: 0.8 K/UL (ref 1–5.1)
LYMPHOCYTES RELATIVE PERCENT: 8.4 %
MAGNESIUM: 2 MG/DL (ref 1.8–2.4)
MCH RBC QN AUTO: 28.3 PG (ref 26–34)
MCHC RBC AUTO-ENTMCNC: 31.9 G/DL (ref 31–36)
MCV RBC AUTO: 88.6 FL (ref 80–100)
MONOCYTES ABSOLUTE: 0.5 K/UL (ref 0–1.3)
MONOCYTES RELATIVE PERCENT: 5 %
NEUTROPHILS ABSOLUTE: 7.9 K/UL (ref 1.7–7.7)
NEUTROPHILS RELATIVE PERCENT: 86.3 %
PDW BLD-RTO: 17.5 % (ref 12.4–15.4)
PHOSPHORUS: 2.7 MG/DL (ref 2.5–4.9)
PLATELET # BLD: 220 K/UL (ref 135–450)
PMV BLD AUTO: 8.3 FL (ref 5–10.5)
POTASSIUM SERPL-SCNC: 4.8 MMOL/L (ref 3.5–5.1)
PROTHROMBIN TIME: 13.6 SEC (ref 9.8–13)
RBC # BLD: 4.81 M/UL (ref 4–5.2)
REASON FOR REJECTION: NORMAL
REJECTED TEST: NORMAL
SODIUM BLD-SCNC: 139 MMOL/L (ref 136–145)
WBC # BLD: 9.2 K/UL (ref 4–11)

## 2018-11-22 PROCEDURE — 92523 SPEECH SOUND LANG COMPREHEN: CPT

## 2018-11-22 PROCEDURE — 6360000002 HC RX W HCPCS: Performed by: NURSE PRACTITIONER

## 2018-11-22 PROCEDURE — 80069 RENAL FUNCTION PANEL: CPT

## 2018-11-22 PROCEDURE — 2580000003 HC RX 258: Performed by: STUDENT IN AN ORGANIZED HEALTH CARE EDUCATION/TRAINING PROGRAM

## 2018-11-22 PROCEDURE — 85025 COMPLETE CBC W/AUTO DIFF WBC: CPT

## 2018-11-22 PROCEDURE — 97535 SELF CARE MNGMENT TRAINING: CPT

## 2018-11-22 PROCEDURE — 85610 PROTHROMBIN TIME: CPT

## 2018-11-22 PROCEDURE — 1200000000 HC SEMI PRIVATE

## 2018-11-22 PROCEDURE — 70450 CT HEAD/BRAIN W/O DYE: CPT

## 2018-11-22 PROCEDURE — 97530 THERAPEUTIC ACTIVITIES: CPT

## 2018-11-22 PROCEDURE — 97112 NEUROMUSCULAR REEDUCATION: CPT

## 2018-11-22 PROCEDURE — 92526 ORAL FUNCTION THERAPY: CPT

## 2018-11-22 PROCEDURE — 6370000000 HC RX 637 (ALT 250 FOR IP): Performed by: STUDENT IN AN ORGANIZED HEALTH CARE EDUCATION/TRAINING PROGRAM

## 2018-11-22 PROCEDURE — 83735 ASSAY OF MAGNESIUM: CPT

## 2018-11-22 PROCEDURE — 36415 COLL VENOUS BLD VENIPUNCTURE: CPT

## 2018-11-22 PROCEDURE — 6370000000 HC RX 637 (ALT 250 FOR IP): Performed by: NURSE PRACTITIONER

## 2018-11-22 RX ORDER — PANTOPRAZOLE SODIUM 40 MG/1
40 TABLET, DELAYED RELEASE ORAL
Status: DISCONTINUED | OUTPATIENT
Start: 2018-11-23 | End: 2018-11-29 | Stop reason: HOSPADM

## 2018-11-22 RX ADMIN — Medication 10 ML: at 21:13

## 2018-11-22 RX ADMIN — DEXAMETHASONE 2 MG: 4 TABLET ORAL at 08:14

## 2018-11-22 RX ADMIN — ATORVASTATIN CALCIUM 20 MG: 20 TABLET, FILM COATED ORAL at 08:14

## 2018-11-22 RX ADMIN — QUETIAPINE FUMARATE 12.5 MG: 25 TABLET ORAL at 21:12

## 2018-11-22 RX ADMIN — LEVOTHYROXINE SODIUM 150 MCG: 150 TABLET ORAL at 06:17

## 2018-11-22 RX ADMIN — DEXAMETHASONE 2 MG: 4 TABLET ORAL at 21:13

## 2018-11-22 RX ADMIN — SODIUM CHLORIDE: 9 INJECTION, SOLUTION INTRAVENOUS at 06:20

## 2018-11-22 ASSESSMENT — PAIN SCALES - GENERAL: PAINLEVEL_OUTOF10: 0

## 2018-11-22 NOTE — PROGRESS NOTES
Pt a/o only to self. VSS. No reports of nausea/ vomiting. Tolerating general diet. No complaints of pain. Pt gets up w/ x2 person assist and front wheel walker. Pt tolerating activity well. Urinary output adequate. Will continue to monitor.

## 2018-11-22 NOTE — PROGRESS NOTES
change since prior exam      Orientation  Orientation  Overall Orientation Status:  (pt oriented to name, birthdate, oriented to conversation)  Objective    ADL  Feeding: Setup; Independent  LE Dressing: Maximum assistance (donning depend brief)  Toileting: Dependent/Total (pt incontinent of urine and unaware, dep with clean up)       Standing Balance  Time: ~3 minutes  Activity: toileting hygiene, LE dressing    Toilet Transfers  Toilet - Technique: Ambulating ( a few steps with rolling walker and Colton of 2-A with walker management)  Equipment Used:  (simulated 3 in1 commode)  Toilet Transfer: Dependent/Total (Colton of 2)    Bed mobility  Supine to Sit: Minimal assistance (head of bed elevated, use of bed rail, 1 episode mod A for trunk)  Scooting: Stand by assistance (cues and increased time)    Transfers  Sit to stand: Dependent/Total (1 trial from bed, 2 trials from simulated 3 in1 commode with Colton of 2)  Stand to sit: Dependent/Total (Colton of 2, cues for hand placement)                       Cognition  Overall Cognitive Status: WFL  Arousal/Alertness: Appropriate responses to stimuli  Following Commands: Follows one step commands consistently  Attention Span: Appears intact  Initiation: Requires cues for some  Sequencing: Requires cues for all  Cognition Comment: pt conversant, following directions, joking with OT/PT                  Assessment   Assessment: Pt demonstrated significant improvement today with transfers, ADLs, cognition. She was conversant and followed all directions. Pt has motor planning difficulties with L hand, and stil requires A of 2 with transfers. Recommend ongoing OT at discharge to maximize functional status.   Treatment Diagnosis: impaired ADLs/transfers/mobility, impaired cognition and impaired motor planning, impaired balance 2* subdural hematomas  Prognosis: Good  Patient Education: role of OT, safe transfer technique-needs reinforcement  REQUIRES OT FOLLOW UP: Yes  Activity

## 2018-11-22 NOTE — PLAN OF CARE
Problem: Falls - Risk of:  Goal: Will remain free from falls  Will remain free from falls    Outcome: Not Met This Shift  Fall risk precautions in place. Bed is locked and in lowest position. 2/4 side rails up. Call light within reach. Fall risk Bracelet in place. Frequent checks on patient. Free from falls at this time. Will continue to monitor. Problem: Injury - Risk of, Physical Injury:  Goal: Will remain free from falls  Will remain free from falls    Outcome: Not Met This Shift  Fall risk precautions in place. Bed is locked and in lowest position. 2/4 side rails up. Call light within reach. Fall risk Bracelet in place. Frequent checks on patient. Free from falls at this time. Will continue to monitor.

## 2018-11-22 NOTE — PROGRESS NOTES
Score : 13  AM-PAC Inpatient T-Scale Score : 36.74  Mobility Inpatient CMS 0-100% Score: 64.91  Mobility Inpatient CMS G-Code Modifier : CL          Goals  Short term goals  Time Frame for Short term goals: discharge  Short term goal 1: Pt will transfer supine <--> sit with mod A x 1 goal met 11/22 - updated goal: pt will transfer supine<> sit with CGA x 1  Short term goal 2: Pt will transfer sit <--> stand with mod A x 1 - ongoing 11/22  Short term goal 3: Pt will transfer bed <--> chair with mod A x 1 - ongoing 11/22  Short term goal 4: Pt will demo unsupported sitting x 2 min with trunk in midline, CGA for balance - ongoing 11/22  Patient Goals   Patient goals : pt did not state    Plan    Plan  Times per week: 5-7  Current Treatment Recommendations: Strengthening, ROM, Balance Training, Functional Mobility Training, Transfer Training, Gait Training, Equipment Evaluation, Education, & procurement, Patient/Caregiver Education & Training, Neuromuscular Re-education  Safety Devices  Type of devices: Nurse notified, Chair alarm in place, Call light within reach, Left in chair     Therapy Time   Individual Concurrent Group Co-treatment   Time In 0830         Time Out 0910         Minutes 40         Timed Code Treatment Minutes: [address], [address], [place] [id number]    If patient is discharged prior to next treatment, this note will serve as the discharge summary.

## 2018-11-23 ENCOUNTER — APPOINTMENT (OUTPATIENT)
Dept: GENERAL RADIOLOGY | Age: 83
DRG: 025 | End: 2018-11-23
Attending: FAMILY MEDICINE
Payer: MEDICARE

## 2018-11-23 LAB
ALBUMIN SERPL-MCNC: 3.2 G/DL (ref 3.4–5)
ANION GAP SERPL CALCULATED.3IONS-SCNC: 13 MMOL/L (ref 3–16)
BASOPHILS ABSOLUTE: 0 K/UL (ref 0–0.2)
BASOPHILS RELATIVE PERCENT: 0.2 %
BUN BLDV-MCNC: 23 MG/DL (ref 7–20)
CALCIUM SERPL-MCNC: 8.9 MG/DL (ref 8.3–10.6)
CHLORIDE BLD-SCNC: 108 MMOL/L (ref 99–110)
CO2: 20 MMOL/L (ref 21–32)
CREAT SERPL-MCNC: 0.6 MG/DL (ref 0.6–1.2)
EOSINOPHILS ABSOLUTE: 0 K/UL (ref 0–0.6)
EOSINOPHILS RELATIVE PERCENT: 0.1 %
GFR AFRICAN AMERICAN: >60
GFR NON-AFRICAN AMERICAN: >60
GLUCOSE BLD-MCNC: 118 MG/DL (ref 70–99)
HCT VFR BLD CALC: 38.3 % (ref 36–48)
HEMOGLOBIN: 12.7 G/DL (ref 12–16)
LYMPHOCYTES ABSOLUTE: 0.7 K/UL (ref 1–5.1)
LYMPHOCYTES RELATIVE PERCENT: 7.4 %
MAGNESIUM: 2 MG/DL (ref 1.8–2.4)
MCH RBC QN AUTO: 28.6 PG (ref 26–34)
MCHC RBC AUTO-ENTMCNC: 33.1 G/DL (ref 31–36)
MCV RBC AUTO: 86.4 FL (ref 80–100)
MONOCYTES ABSOLUTE: 0.5 K/UL (ref 0–1.3)
MONOCYTES RELATIVE PERCENT: 5.1 %
NEUTROPHILS ABSOLUTE: 8.8 K/UL (ref 1.7–7.7)
NEUTROPHILS RELATIVE PERCENT: 87.2 %
PDW BLD-RTO: 17 % (ref 12.4–15.4)
PHOSPHORUS: 2.5 MG/DL (ref 2.5–4.9)
PLATELET # BLD: 206 K/UL (ref 135–450)
PMV BLD AUTO: 8.6 FL (ref 5–10.5)
POTASSIUM SERPL-SCNC: 4.7 MMOL/L (ref 3.5–5.1)
RBC # BLD: 4.43 M/UL (ref 4–5.2)
SODIUM BLD-SCNC: 141 MMOL/L (ref 136–145)
WBC # BLD: 10 K/UL (ref 4–11)

## 2018-11-23 PROCEDURE — 2580000003 HC RX 258: Performed by: STUDENT IN AN ORGANIZED HEALTH CARE EDUCATION/TRAINING PROGRAM

## 2018-11-23 PROCEDURE — 83735 ASSAY OF MAGNESIUM: CPT

## 2018-11-23 PROCEDURE — 1200000000 HC SEMI PRIVATE

## 2018-11-23 PROCEDURE — 6370000000 HC RX 637 (ALT 250 FOR IP): Performed by: NURSE PRACTITIONER

## 2018-11-23 PROCEDURE — 74230 X-RAY XM SWLNG FUNCJ C+: CPT

## 2018-11-23 PROCEDURE — 97535 SELF CARE MNGMENT TRAINING: CPT

## 2018-11-23 PROCEDURE — 6370000000 HC RX 637 (ALT 250 FOR IP): Performed by: STUDENT IN AN ORGANIZED HEALTH CARE EDUCATION/TRAINING PROGRAM

## 2018-11-23 PROCEDURE — 85025 COMPLETE CBC W/AUTO DIFF WBC: CPT

## 2018-11-23 PROCEDURE — 6370000000 HC RX 637 (ALT 250 FOR IP): Performed by: INTERNAL MEDICINE

## 2018-11-23 PROCEDURE — 92611 MOTION FLUOROSCOPY/SWALLOW: CPT

## 2018-11-23 PROCEDURE — 80069 RENAL FUNCTION PANEL: CPT

## 2018-11-23 PROCEDURE — 6360000002 HC RX W HCPCS: Performed by: NURSE PRACTITIONER

## 2018-11-23 PROCEDURE — 97530 THERAPEUTIC ACTIVITIES: CPT

## 2018-11-23 PROCEDURE — 36415 COLL VENOUS BLD VENIPUNCTURE: CPT

## 2018-11-23 RX ORDER — BISACODYL 10 MG
10 SUPPOSITORY, RECTAL RECTAL DAILY PRN
Status: DISCONTINUED | OUTPATIENT
Start: 2018-11-23 | End: 2018-11-29 | Stop reason: HOSPADM

## 2018-11-23 RX ORDER — POLYETHYLENE GLYCOL 3350 17 G/17G
17 POWDER, FOR SOLUTION ORAL DAILY
Status: DISCONTINUED | OUTPATIENT
Start: 2018-11-23 | End: 2018-11-29 | Stop reason: HOSPADM

## 2018-11-23 RX ADMIN — POLYETHYLENE GLYCOL (3350) 17 G: 17 POWDER, FOR SOLUTION ORAL at 11:56

## 2018-11-23 RX ADMIN — DEXAMETHASONE 2 MG: 4 TABLET ORAL at 08:41

## 2018-11-23 RX ADMIN — ATORVASTATIN CALCIUM 20 MG: 20 TABLET, FILM COATED ORAL at 08:41

## 2018-11-23 RX ADMIN — LEVOTHYROXINE SODIUM 150 MCG: 150 TABLET ORAL at 06:42

## 2018-11-23 RX ADMIN — Medication 10 ML: at 21:17

## 2018-11-23 RX ADMIN — PANTOPRAZOLE SODIUM 40 MG: 40 TABLET, DELAYED RELEASE ORAL at 06:42

## 2018-11-23 RX ADMIN — DEXAMETHASONE 2 MG: 4 TABLET ORAL at 21:13

## 2018-11-23 RX ADMIN — Medication 10 ML: at 08:42

## 2018-11-23 RX ADMIN — QUETIAPINE FUMARATE 12.5 MG: 25 TABLET ORAL at 21:12

## 2018-11-23 ASSESSMENT — PAIN SCALES - GENERAL: PAINLEVEL_OUTOF10: 0

## 2018-11-23 NOTE — PROGRESS NOTES
Neurosurgery Progress Note    2018 9:21 AM                               [id number] [facility]                      LOS: 4 days               Subjective:  No acute events overnight. Patient has no specific complaints this am.                                                 Physical Exam:    Temp (24hrs), Av.5 °F (36.4 °C), Min:97.4 °F (36.3 °C), Max:97.6 °F (36.4 °C)      Neuro Exam  Alert and oriented to self only  PERRL, EOMI, 3->2  mm OU, visual fields grossly intact  Facial expression and sensation symmetric  Tongue and uvula midline  Shoulder shrug symmetric  Neurologically stable with GCS E-4, V-4 (oriented to self only), M-6 (follows appendicular commands, strength 4/5 nonfocal).    Labs:  Recent Labs      18   0530   WBC  10.0   HGB  12.7   HCT  38.3   PLT  206       Recent Labs      18   0530   NA  141   K  4.7   CL  108   CO2  20*   BUN  23*   CREATININE  0.6   GLUCOSE  118*   CALCIUM  8.9   PHOS  2.5   MG  2.00       Recent Labs      18   1121   PROTIME  13.6*   INR  1.19*       Assessment:  Pt is a 80y.o. year old female with large bilateral  SDH. Pt is on plavix    Plan:  -Neuro stable  -Continued Decadron 2mg BID  -PPI, SSI while on steroids  -Maintain SBP < 160 mm Hg  -Maintain platelets > 456U, INR < 1.4  -CT Head wo on Monday,  to evaluate for changes in ventricular size and extraaxial collections s/p VPS reprogramming  -Will likely require evacuation of SDH next week after Plavix effect wears off.      DISPO: Remain inpatient from neurosurgery standpoint. Dispo timing to be determined by primary team once patient is medically stable for discharge.     Patient was seen and examined with Dr. Marcella Yee who agrees with above assessment and plan.

## 2018-11-23 NOTE — PROGRESS NOTES
Self-Care / ADL    AM-PAC Score        AM-PAC Inpatient Daily Activity Raw Score: 14  AM-PAC Inpatient ADL T-Scale Score : 33.39  ADL Inpatient CMS 0-100% Score: 59.67  ADL Inpatient CMS G-Code Modifier : CK    Goals (as determined and assessed by primary OT)  Short term goals  Time Frame for Short term goals: Discharge  Short term goal 1: supine to sit w/ Min A of 2 -11/22 goal met, updated goal:  supine to sit with CGA - not met  Short term goal 2: sitting at EOB midline x 5 minutes w/ CGA and verbal cues prn-11/22 goal met  Short term goal 3: simple grooming task w/ setup and Min A-11/22 goal not addressed -  goal met 11/23  Short term goal 4: stand pivot transfer w/ Mod A of 2-11/22 goal met, updated goal-transfer to commode with [place] - not met  Short term goal 5: follow simple one-step commands 50% of the time for increased participation-11/22 goal exceeded, d/c goal  Patient Goals   Patient goals : no goal stated       Therapy Time   Individual Concurrent Group Co-treatment   Time In 1413         Time Out 1503         Minutes 50         Timed Code Treatment Minutes: 50 Minutes      Total Treatment minutes: [address] Espit[address]/DAWNA

## 2018-11-24 LAB
ALBUMIN SERPL-MCNC: 3.4 G/DL (ref 3.4–5)
ANION GAP SERPL CALCULATED.3IONS-SCNC: 12 MMOL/L (ref 3–16)
BASOPHILS ABSOLUTE: 0 K/UL (ref 0–0.2)
BASOPHILS RELATIVE PERCENT: 0.3 %
BLOOD CULTURE, ROUTINE: NORMAL
BUN BLDV-MCNC: 24 MG/DL (ref 7–20)
CALCIUM SERPL-MCNC: 9.1 MG/DL (ref 8.3–10.6)
CHLORIDE BLD-SCNC: 109 MMOL/L (ref 99–110)
CO2: 21 MMOL/L (ref 21–32)
CREAT SERPL-MCNC: 0.5 MG/DL (ref 0.6–1.2)
CULTURE, BLOOD 2: NORMAL
EOSINOPHILS ABSOLUTE: 0 K/UL (ref 0–0.6)
EOSINOPHILS RELATIVE PERCENT: 0.2 %
GFR AFRICAN AMERICAN: >60
GFR NON-AFRICAN AMERICAN: >60
GLUCOSE BLD-MCNC: 118 MG/DL (ref 70–99)
HCT VFR BLD CALC: 42.9 % (ref 36–48)
HEMOGLOBIN: 13.6 G/DL (ref 12–16)
INR BLD: 1.18 (ref 0.86–1.14)
LYMPHOCYTES ABSOLUTE: 0.9 K/UL (ref 1–5.1)
LYMPHOCYTES RELATIVE PERCENT: 8 %
MAGNESIUM: 2 MG/DL (ref 1.8–2.4)
MCH RBC QN AUTO: 27.8 PG (ref 26–34)
MCHC RBC AUTO-ENTMCNC: 31.7 G/DL (ref 31–36)
MCV RBC AUTO: 87.7 FL (ref 80–100)
MONOCYTES ABSOLUTE: 0.6 K/UL (ref 0–1.3)
MONOCYTES RELATIVE PERCENT: 5 %
NEUTROPHILS ABSOLUTE: 9.8 K/UL (ref 1.7–7.7)
NEUTROPHILS RELATIVE PERCENT: 86.5 %
PDW BLD-RTO: 17.4 % (ref 12.4–15.4)
PHOSPHORUS: 2.8 MG/DL (ref 2.5–4.9)
PLATELET # BLD: 203 K/UL (ref 135–450)
PMV BLD AUTO: 9.1 FL (ref 5–10.5)
POTASSIUM SERPL-SCNC: 5.2 MMOL/L (ref 3.5–5.1)
PROTHROMBIN TIME: 13.5 SEC (ref 9.8–13)
RBC # BLD: 4.89 M/UL (ref 4–5.2)
SODIUM BLD-SCNC: 142 MMOL/L (ref 136–145)
TSH REFLEX: 0.5 UIU/ML (ref 0.27–4.2)
WBC # BLD: 11.3 K/UL (ref 4–11)

## 2018-11-24 PROCEDURE — APPSS30 APP SPLIT SHARED TIME 16-30 MINUTES: Performed by: NURSE PRACTITIONER

## 2018-11-24 PROCEDURE — 1200000000 HC SEMI PRIVATE

## 2018-11-24 PROCEDURE — 6360000002 HC RX W HCPCS: Performed by: NURSE PRACTITIONER

## 2018-11-24 PROCEDURE — 84443 ASSAY THYROID STIM HORMONE: CPT

## 2018-11-24 PROCEDURE — 80069 RENAL FUNCTION PANEL: CPT

## 2018-11-24 PROCEDURE — 83735 ASSAY OF MAGNESIUM: CPT

## 2018-11-24 PROCEDURE — 99223 1ST HOSP IP/OBS HIGH 75: CPT | Performed by: INTERNAL MEDICINE

## 2018-11-24 PROCEDURE — 97116 GAIT TRAINING THERAPY: CPT

## 2018-11-24 PROCEDURE — 97110 THERAPEUTIC EXERCISES: CPT

## 2018-11-24 PROCEDURE — 6370000000 HC RX 637 (ALT 250 FOR IP): Performed by: INTERNAL MEDICINE

## 2018-11-24 PROCEDURE — 85610 PROTHROMBIN TIME: CPT

## 2018-11-24 PROCEDURE — 93005 ELECTROCARDIOGRAM TRACING: CPT | Performed by: INTERNAL MEDICINE

## 2018-11-24 PROCEDURE — 6370000000 HC RX 637 (ALT 250 FOR IP): Performed by: STUDENT IN AN ORGANIZED HEALTH CARE EDUCATION/TRAINING PROGRAM

## 2018-11-24 PROCEDURE — 36415 COLL VENOUS BLD VENIPUNCTURE: CPT

## 2018-11-24 PROCEDURE — 2580000003 HC RX 258: Performed by: STUDENT IN AN ORGANIZED HEALTH CARE EDUCATION/TRAINING PROGRAM

## 2018-11-24 PROCEDURE — 85025 COMPLETE CBC W/AUTO DIFF WBC: CPT

## 2018-11-24 RX ADMIN — LEVOTHYROXINE SODIUM 150 MCG: 150 TABLET ORAL at 06:29

## 2018-11-24 RX ADMIN — DEXAMETHASONE 2 MG: 4 TABLET ORAL at 09:48

## 2018-11-24 RX ADMIN — ATORVASTATIN CALCIUM 20 MG: 20 TABLET, FILM COATED ORAL at 09:48

## 2018-11-24 RX ADMIN — Medication 10 ML: at 20:41

## 2018-11-24 RX ADMIN — PANTOPRAZOLE SODIUM 40 MG: 40 TABLET, DELAYED RELEASE ORAL at 06:29

## 2018-11-24 RX ADMIN — POLYETHYLENE GLYCOL (3350) 17 G: 17 POWDER, FOR SOLUTION ORAL at 09:48

## 2018-11-24 RX ADMIN — DEXAMETHASONE 2 MG: 4 TABLET ORAL at 20:41

## 2018-11-24 RX ADMIN — Medication 10 ML: at 09:48

## 2018-11-24 NOTE — PLAN OF CARE
Problem: Falls - Risk of:  Goal: Absence of physical injury  Absence of physical injury    Outcome: Ongoing  Pt is alert to self and able to dhiraj the call button as needed. Up to the bedside commode x 2 with walker. Absence of falls and injury. will cont to reassess and monitor high fall risk. Problem: Injury - Risk of, Physical Injury:  Goal: Absence of physical injury  Absence of physical injury    Outcome: Ongoing  Pt is alert to self and able to dhiraj the call button as needed. Up to the bedside commode x 2 with walker. Absence of falls and injury. will cont to reassess and monitor high fall risk.

## 2018-11-24 NOTE — PROGRESS NOTES
years ago due to gait instability  - Neurosurgery following- adjusting rate of drainage as necessary   - will have repeat imaging on 11/26, with re-evaluation of shunt rate adjustment      HTN- Stable 120s-150s  - Maintain SBP < 160  - D/C HTCZ in setting of brain bleed     HLD  - Cont home lipitor 20mg daily     pA-Fib- Patient's EKG on presentation indicated A-Fib; however rate controlled into the 70s   - at home Toprol XL 50mg daily- held overnight 2/2 HR in the 40's - 50's   - re-start if become tachy >100  - patient with 2.5 second pause overnight, cont to hold Toprol XL  - check TSH     Hypothyroidism   - Levothyroxine 150mcg     History of UTI- Patient started on daily Ivanz treatment (11/15) for treatment of UTI. No urine cultures on chart review. - D/ C Ivanz     FEN: Dysphagia II diet    VTE Prophylaxis: SCD's    Disposition: Wards, re-imaging on 11/26/18 with possible evacuation next week sometime. Full Code    I will discuss the patient with MD Denise Marvin M.D.    Internal Medicine Resident, PGY-3  Pager: [phone]  11/24/2018, 8:56 AM

## 2018-11-24 NOTE — CONSULTS
contour without delay or bruit  · Peripheral pulses are symmetrical and full  · There is no clubbing, cyanosis of the extremities. · No peripheral edema  · Femoral Arteries: 2+ and equal  · Pedal Pulses:2+ and equal   ABDOMEN[de-identified]  · No masses or tenderness  · Liver/Spleen: No Abnormalities Noted  NEUROLOGICAL:  SKIN: No lesions or rashes  LYMPH NODES: none enlarged    ·   ·   ·     CBC with Differential:    Lab Results   Component Value Date    WBC 11.3 11/24/2018    RBC 4.89 11/24/2018    HGB 13.6 11/24/2018    HCT 42.9 11/24/2018     11/24/2018    MCV 87.7 11/24/2018    MCH 27.8 11/24/2018    MCHC 31.7 11/24/2018    RDW 17.4 11/24/2018    LYMPHOPCT 8.0 11/24/2018    MONOPCT 5.0 11/24/2018    BASOPCT 0.3 11/24/2018    MONOSABS 0.6 11/24/2018    LYMPHSABS 0.9 11/24/2018    EOSABS 0.0 11/24/2018    BASOSABS 0.0 11/24/2018     BMP:    Lab Results   Component Value Date     11/24/2018    K 5.2 11/24/2018     11/24/2018    CO2 21 11/24/2018    BUN 24 11/24/2018    LABALBU 3.4 11/24/2018    CREATININE 0.5 11/24/2018    CALCIUM 9.1 11/24/2018    GFRAA >60 11/24/2018    LABGLOM >60 11/24/2018     Uric Acid:  No components found for: URIC  PT/INR:    Lab Results   Component Value Date    PROTIME 13.5 11/24/2018    INR 1.18 11/24/2018     Last 3 Troponin:    Lab Results   Component Value Date    TROPONINI <0.01 11/19/2018     FLP:  No results found for: TRIG, HDL, LDLCALC, LDLDIRECT, LABVLDL    EKG: Atrial fibrillation 65/m. Nonspecific ST and T wave changes on 11/24/18.  echocardiogram  Assessment/ Plan     Patient Active Problem List    Diagnosis Date Noted    Subdural hematoma ([address] 11/19/2018   Patient admitted primarily with acute on chronic subdural hematomas. Atrial fibrillation with a stable right however at times pauses. The electrolytes are normal with potassium 5.0. Creatinine 0.9 magnesium level is 2.0. No anticoagulations at this time due to the subdural hematoma.   There are no

## 2018-11-24 NOTE — CONSULTS
fibrillation ([facility] [address] Dyspnea; Hereditary and idiopathic neuropathy; Hydrocephalus; Hyperlipidemia; Hypertension; Localized edema; Muscle weakness (generalized); Syncope and collapse; Thyroid disease; Unspecified kidney failure; and UTI (urinary tract infection). Surgical History:   has a past surgical history that includes Hysterectomy; Cholecystectomy; and csf shunt. Social History:   reports that she has never smoked. She has never used smokeless tobacco. She reports that she does not drink alcohol or use drugs. Family History:  family history is not on file. Home Medications:  Prior to Admission medications    Medication Sig Start Date End Date Taking? Authorizing Provider   aspirin 81 MG tablet Take 81 mg by mouth daily   Yes Historical Provider, MD   calcium carbonate 600 MG TABS tablet Take 2.5 tablets by mouth daily   Yes Historical Provider, MD   furosemide (LASIX) 20 MG tablet Take 20 mg by mouth daily   Yes Historical Provider, MD   levothyroxine (SYNTHROID) 150 MCG tablet Take 150 mcg by mouth Daily   Yes Historical Provider, MD   Multiple Vitamins-Minerals (THERAPEUTIC MULTIVITAMIN-MINERALS) tablet Take 1 tablet by mouth daily   Yes Historical Provider, MD   hydrochlorothiazide (HYDRODIURIL) 25 MG tablet Take 25 mg by mouth daily. Yes Historical Provider, MD   clopidogrel (PLAVIX) 75 MG tablet Take 75 mg by mouth daily    Historical Provider, MD   ertapenem (INVANZ) 1 GM injection Inject 1,000 mg into the muscle every 24 hours Until 11/23/2018 for uti    Historical Provider, MD   metoprolol succinate (TOPROL XL) 50 MG extended release tablet Take 50 mg by mouth daily    Historical Provider, MD   Mirabegron ER 25 MG TB24 Take 25 mg by mouth daily    Historical Provider, MD   nystatin (MYCOSTATIN) 783484 UNIT/GM powder Apply topically 4 times daily Apply topically 4 times daily. Historical Provider, MD   atorvastatin (LIPITOR) 20 MG tablet Take 20 mg by mouth daily.     Historical MD Oneal        Current Facility-Administered Medications   Medication Dose Route Frequency Provider Last Rate Last Dose    polyethylene glycol (GLYCOLAX) packet 17 g  17 g Oral Daily Kelly Dick MD   17 g at 11/23/18 1156    bisacodyl (DULCOLAX) suppository 10 mg  10 mg Rectal Daily PRN Kelly Dick MD        pantoprazole (PROTONIX) tablet 40 mg  40 mg Oral QAM AC Rodrigo Kemp MD   40 mg at 11/24/18 8681    dexamethasone (DECADRON) tablet 2 mg  2 mg Oral 2 times per day IBRAHIMA Jung - CNP   2 mg at 11/23/18 2113    atorvastatin (LIPITOR) tablet 20 mg  20 mg Oral Daily Kavya Adams MD   20 mg at 11/23/18 0841    levothyroxine (SYNTHROID) tablet 150 mcg  150 mcg Oral Daily Kavya Adams MD   150 mcg at 11/24/18 7648    sodium chloride flush 0.9 % injection 10 mL  10 mL Intravenous 2 times per day Kavya Adams MD   10 mL at 11/23/18 2117    sodium chloride flush 0.9 % injection 10 mL  10 mL Intravenous PRN Kavya Adams MD        acetaminophen (TYLENOL) tablet 650 mg  650 mg Oral Q4H PRN Kavya Adams MD        ondansetron [facility]) injection 4 mg  4 mg Intravenous Q6H PRN Kavya Adams MD           Allergies:  Patient has no known allergies. · ROS:   · Cardiovascular: Reviewed in HPI  · Allergic/Immunologic: No nasal congestion or hives. · All other ROS are reviewed and are unremarkable. Physical Examination:    Vitals:    11/23/18 1945 11/23/18 2315 11/24/18 0245 11/24/18 0745   BP: 125/72 (!) 150/69 136/67 (!) 149/67   Pulse: 86 88 58 62   Resp: 16 17 15 14   Temp: 98.1 °F (36.7 °C) 98.2 °F (36.8 °C) 97.8 °F (36.6 °C) 97.4 °F (36.3 °C)   TempSrc: Oral Oral Oral Oral   SpO2: 100% 99% 99% 100%   Weight:       Height:            Constitutional and General Appearance:  Skin neg     HEENT: eyes and ears intact.  No nasal masses  THYROID: not enlarged  LUNGS clear   HEART and VASCULAR:irregular   EXTREMITIES   ABDOMEN:neg  Neuro neg  Psych neg     Labs

## 2018-11-24 NOTE — PROGRESS NOTES
11/24  Revised Goal: Pt will transfer sit to stand with CGA  Short term goal 3: Pt will transfer bed <--> chair with mod A x 1  MET 11/24    Short term goal 4: Pt will demo unsupported sitting x 2 min with trunk in midline, CGA for balance  MET 11/24  Short term goal 5: New Goal (set on 11/24): Pt will ambulate 40 feet with wheeled walker and min assist  Patient Goals   Patient goals : pt did not state    Plan    Plan  Times per week: 5-7  Current Treatment Recommendations: Strengthening, ROM, Balance Training, Functional Mobility Training, Transfer Training, Gait Training, Equipment Evaluation, Education, & procurement, Patient/Caregiver Education & Training, Neuromuscular Re-education  Safety Devices  Type of devices: Nurse notified, Chair alarm in place, Call light within reach, Left in chair     Therapy Time   Individual Concurrent Group Co-treatment   Time In 1415         Time Out 1445         Minutes 30           Timed Code Treatment Minutes:  30 Minutes    Total Treatment Minutes:  30     If pt d/c'd prior to next treatment, this note serves as a discharge note.   [place], [id number] [facility]

## 2018-11-25 ENCOUNTER — APPOINTMENT (OUTPATIENT)
Dept: CT IMAGING | Age: 83
DRG: 025 | End: 2018-11-25
Attending: FAMILY MEDICINE
Payer: MEDICARE

## 2018-11-25 LAB
ALBUMIN SERPL-MCNC: 3 G/DL (ref 3.4–5)
ANION GAP SERPL CALCULATED.3IONS-SCNC: 11 MMOL/L (ref 3–16)
BASOPHILS ABSOLUTE: 0 K/UL (ref 0–0.2)
BASOPHILS RELATIVE PERCENT: 0 %
BUN BLDV-MCNC: 27 MG/DL (ref 7–20)
CALCIUM SERPL-MCNC: 8.9 MG/DL (ref 8.3–10.6)
CHLORIDE BLD-SCNC: 110 MMOL/L (ref 99–110)
CO2: 22 MMOL/L (ref 21–32)
CREAT SERPL-MCNC: 0.6 MG/DL (ref 0.6–1.2)
EOSINOPHILS ABSOLUTE: 0 K/UL (ref 0–0.6)
EOSINOPHILS RELATIVE PERCENT: 0 %
GFR AFRICAN AMERICAN: >60
GFR NON-AFRICAN AMERICAN: >60
GLUCOSE BLD-MCNC: 133 MG/DL (ref 70–99)
HCT VFR BLD CALC: 37.4 % (ref 36–48)
HEMOGLOBIN: 12.1 G/DL (ref 12–16)
INR BLD: 1.21 (ref 0.86–1.14)
LYMPHOCYTES ABSOLUTE: 0.8 K/UL (ref 1–5.1)
LYMPHOCYTES RELATIVE PERCENT: 7.5 %
MAGNESIUM: 1.9 MG/DL (ref 1.8–2.4)
MCH RBC QN AUTO: 28.3 PG (ref 26–34)
MCHC RBC AUTO-ENTMCNC: 32.5 G/DL (ref 31–36)
MCV RBC AUTO: 87.1 FL (ref 80–100)
MONOCYTES ABSOLUTE: 0.5 K/UL (ref 0–1.3)
MONOCYTES RELATIVE PERCENT: 4.4 %
NEUTROPHILS ABSOLUTE: 9.4 K/UL (ref 1.7–7.7)
NEUTROPHILS RELATIVE PERCENT: 88.1 %
PDW BLD-RTO: 17.3 % (ref 12.4–15.4)
PHOSPHORUS: 3.4 MG/DL (ref 2.5–4.9)
PLATELET # BLD: 231 K/UL (ref 135–450)
PMV BLD AUTO: 8.7 FL (ref 5–10.5)
POTASSIUM SERPL-SCNC: 4.7 MMOL/L (ref 3.5–5.1)
PROTHROMBIN TIME: 13.8 SEC (ref 9.8–13)
RBC # BLD: 4.29 M/UL (ref 4–5.2)
SODIUM BLD-SCNC: 143 MMOL/L (ref 136–145)
WBC # BLD: 10.7 K/UL (ref 4–11)

## 2018-11-25 PROCEDURE — 6370000000 HC RX 637 (ALT 250 FOR IP): Performed by: NURSE PRACTITIONER

## 2018-11-25 PROCEDURE — 99233 SBSQ HOSP IP/OBS HIGH 50: CPT | Performed by: INTERNAL MEDICINE

## 2018-11-25 PROCEDURE — 6370000000 HC RX 637 (ALT 250 FOR IP): Performed by: STUDENT IN AN ORGANIZED HEALTH CARE EDUCATION/TRAINING PROGRAM

## 2018-11-25 PROCEDURE — 6360000002 HC RX W HCPCS: Performed by: NURSE PRACTITIONER

## 2018-11-25 PROCEDURE — 1200000000 HC SEMI PRIVATE

## 2018-11-25 PROCEDURE — 83735 ASSAY OF MAGNESIUM: CPT

## 2018-11-25 PROCEDURE — 80069 RENAL FUNCTION PANEL: CPT

## 2018-11-25 PROCEDURE — 6360000002 HC RX W HCPCS: Performed by: INTERNAL MEDICINE

## 2018-11-25 PROCEDURE — 70450 CT HEAD/BRAIN W/O DYE: CPT

## 2018-11-25 PROCEDURE — 92526 ORAL FUNCTION THERAPY: CPT

## 2018-11-25 PROCEDURE — 2580000003 HC RX 258: Performed by: STUDENT IN AN ORGANIZED HEALTH CARE EDUCATION/TRAINING PROGRAM

## 2018-11-25 PROCEDURE — 85025 COMPLETE CBC W/AUTO DIFF WBC: CPT

## 2018-11-25 PROCEDURE — 36415 COLL VENOUS BLD VENIPUNCTURE: CPT

## 2018-11-25 PROCEDURE — 6370000000 HC RX 637 (ALT 250 FOR IP): Performed by: INTERNAL MEDICINE

## 2018-11-25 PROCEDURE — 85610 PROTHROMBIN TIME: CPT

## 2018-11-25 RX ORDER — HYDRALAZINE HYDROCHLORIDE 20 MG/ML
10 INJECTION INTRAMUSCULAR; INTRAVENOUS EVERY 6 HOURS PRN
Status: DISCONTINUED | OUTPATIENT
Start: 2018-11-25 | End: 2018-11-27

## 2018-11-25 RX ADMIN — ATORVASTATIN CALCIUM 20 MG: 20 TABLET, FILM COATED ORAL at 09:18

## 2018-11-25 RX ADMIN — Medication 10 ML: at 09:19

## 2018-11-25 RX ADMIN — POLYETHYLENE GLYCOL (3350) 17 G: 17 POWDER, FOR SOLUTION ORAL at 09:18

## 2018-11-25 RX ADMIN — HYDRALAZINE HYDROCHLORIDE 10 MG: 20 INJECTION INTRAMUSCULAR; INTRAVENOUS at 09:20

## 2018-11-25 RX ADMIN — Medication 10 ML: at 20:45

## 2018-11-25 RX ADMIN — METOPROLOL TARTRATE 25 MG: 25 TABLET ORAL at 20:44

## 2018-11-25 RX ADMIN — LEVOTHYROXINE SODIUM 150 MCG: 150 TABLET ORAL at 05:46

## 2018-11-25 RX ADMIN — PANTOPRAZOLE SODIUM 40 MG: 40 TABLET, DELAYED RELEASE ORAL at 05:46

## 2018-11-25 RX ADMIN — METOPROLOL TARTRATE 25 MG: 25 TABLET ORAL at 12:48

## 2018-11-25 RX ADMIN — DEXAMETHASONE 2 MG: 4 TABLET ORAL at 09:18

## 2018-11-25 RX ADMIN — DEXAMETHASONE 2 MG: 4 TABLET ORAL at 20:44

## 2018-11-25 ASSESSMENT — PAIN SCALES - GENERAL
PAINLEVEL_OUTOF10: 0

## 2018-11-25 NOTE — PROGRESS NOTES
lower extremity Normal ROM, Normal strength, No swelling, No deformity, L lower extremity weaker than Right, strength 4/5   Neurologic: right corner of mouth droop, rest of cranial nerves intact   Psychiatric: Cooperative, appropriate mood and affect, normal judgment, non-suicidal        DATA:       Labs:  CBC:   Recent Labs      11/23/18   0530  11/24/18   0534  11/25/18   0509   WBC  10.0  11.3*  10.7   HGB  12.7  13.6  12.1   HCT  38.3  42.9  37.4   PLT  206  203  231       BMP: Recent Labs      11/23/18   0530  11/24/18   0534  11/25/18   0509   NA  141  142  143   K  4.7  5.2*  4.7   CL  108  109  110   CO2  20*  21  22   BUN  23*  24*  27*   CREATININE  0.6  0.5*  0.6   GLUCOSE  118*  118*  133*     LFT's: No results for input(s): AST, ALT, ALB, BILITOT, ALKPHOS in the last 72 hours. Troponin: No results for input(s): TROPONINI in the last 72 hours. BNP: No results for input(s): BNP in the last 72 hours. ABGs: No results for input(s): PHART, GQV2AWI, PO2ART in the last 72 hours. INR:   Recent Labs      11/22/18   1121  11/24/18   0544  11/25/18   0509   INR  1.19*  1.18*  1.21*       U/A:No results for input(s): NITRITE, COLORU, PHUR, LABCAST, WBCUA, RBCUA, MUCUS, TRICHOMONAS, YEAST, BACTERIA, CLARITYU, SPECGRAV, LEUKOCYTESUR, UROBILINOGEN, BILIRUBINUR, BLOODU, GLUCOSEU, AMORPHOUS in the last 72 hours. Invalid input(s): KETONESU    FL MODIFIED BARIUM SWALLOW W VIDEO   Final Result      No penetration or aspiration. CT HEAD WO CONTRAST   Final Result     No significant interval change since prior exam.          XR SHUNT SERIES PLACEMENT (<4 VIEWS)   Final Result      Right frontal approach  shunt catheter tubing is intact. CT head without contrast   Final Result   1. Reidentified large bilateral subdural fluid collections along both    cerebral convexities as well as the bilateral tentorial leaflets and both    sides of the falx cerebri.   These again measure up to approximately 3.3    cm on the left and 2.9 cm on the right. Exact comparison is difficult    due to differences in angulation. There is a component of acute and    subacute hemorrhage although the chronic components could represent    chronic subdural hematomas, effusions, or hygromas. These collections    result in marked compression of the bilateral cerebral hemispheres and    unchanged 5 mm rightward midline shift. 2.  Reidentified right frontal approach ventriculostomy catheter with tip    terminating in the right frontal horn. The ventricles are unchanged in    size and nondilated. CT HEAD WO CONTRAST    (Results Pending)         ASSESSMENT AND PLAN:   Janett Gandhi a 80 y. o. female with PMHx significant for HTN, HLD, NPH s/p shunt x 2 years ago, hypothyroidism and pA-fib who presents from [place] ED with large AoC SDH.      #Acute on chronic SDH in setting of ASA and plavix use- S/P desmopressin and platelets.  Head CT: bilateral large acute on chronic subdural hematoma with approximate 5mm of left-to-right shift. No focal neurological deficits, chronic left sided weakness at baseline. Neurosurgery following- readjusts  shunt as needed Will likely require evacuation sometime next week after Plavix wears off.   - Neuro checks Q 2h  - Elevate HOB 30 degrees  - Decadron 2mg q12H  - Hold all AC/AP agents  - Blood pressure control, SBP goal <160   - Desmopressin and 5 units of platelets in setting of ASA and Plavix use   - Re-image on11/22/18 for changes in ventricular size, likely require evacuation of SDH next week    -Very large bilateral subdural hematomas with slight enlargement of the subdural hematoma along the posterior falx.     Slight enlargement of the ventricles and comparison to previous exam with stable ventriculostomy shunt catheter.     #AMS- resolved  -medications reviewed, no obvious source found   - See plan above      #Normal pressure hydrocephalus- Patient with history of NPH, underwent shunt

## 2018-11-25 NOTE — PROGRESS NOTES
Patient is a/o x2 to self and situation/. Patient denies c/o nausea/pain. VSS. Non-skid socks on, SCD'S remain in place. Patient x 1 SBA w/gait belt and walker to bathroom, return to bed, tolerates well. Fall precautions in place,  camera in use. Bed locked and in lowest position, bedside table and nurse call light within reach. Instructed patient to call out for assistance. Patient remains free from falls at this time, will continue to monitor.

## 2018-11-25 NOTE — PROGRESS NOTES
Neurosurgery Progress Note    2018 11:16 AM                               Rhea Bello                      LOS: 6 days               Subjective:  No acute events overnight.  Patient has no specific complaints this am.                                                 Physical Exam:    Temp (24hrs), Av.6 °F (36.4 °C), Min:97.4 °F (36.3 °C), Max:97.9 °F (36.6 °C)      Neuro Exam  Alert and oriented x 2  PERRL, EOMI, 3->2  mm OU, visual fields grossly intact  Facial expression and sensation symmetric  Tongue and uvula midline  Shoulder shrug symmetric     Labs:  Recent Labs      18   0509   WBC  10.7   HGB  12.1   HCT  37.4   PLT  231       Recent Labs      18   0509   NA  143   K  4.7   CL  110   CO2  22   BUN  27*   CREATININE  0.6   GLUCOSE  133*   CALCIUM  8.9   PHOS  3.4   MG  1.90       Recent Labs      18   0509   PROTIME  13.8*   INR  1.21*         Assessment and Plan:  Pt is a 80y.o. year old female with Acute on chronic subdural hematoma-Plan for imaging on Monday and subdural hematoma evacuation on Tuesday after plavix effect wears off    Darus Mely QUINONEZ

## 2018-11-25 NOTE — PLAN OF CARE
Problem: Falls - Risk of:  Goal: Will remain free from falls  Will remain free from falls    Outcome: Ongoing  Hourly rounding on patient for needs. Non-skid socks on, bed in lowest position and locked. Bedside table, personal belongs, and nurse call light within reach. Instructed patient to use call light for assistance. Bed alarm on, camera in use for safety. Floor clear of clutter. Patient remains free of falls at this time. Will continue to monitor. Problem: Risk for Impaired Skin Integrity  Goal: Tissue integrity - skin and mucous membranes  Structural intactness and normal physiological function of skin and  mucous membranes. Outcome: Ongoing  Turn patient q2h, patient on specialty mattress. Problem: ACTIVITY INTOLERANCE/IMPAIRED MOBILITY  Goal: Mobility/activity is maintained at optimum level for patient  Outcome: Ongoing  Pt x1 SBA w/gait belt and walker. Ambulated to bathroom, return to bed, tolerated well. Will continue to monitor. Problem: Injury - Risk of, Physical Injury:  Goal: Will remain free from falls  Will remain free from falls    Outcome: Ongoing  Hourly rounding on patient for needs. Non-skid socks on, bed in lowest position and locked. Bedside table, personal belongs, and nurse call light within reach. Instructed patient to use call light for assistance. Bed alarm on, camera in use for safety. Floor clear of clutter. Patient remains free of falls at this time. Will continue to monitor.

## 2018-11-26 ENCOUNTER — ANESTHESIA EVENT (OUTPATIENT)
Dept: OPERATING ROOM | Age: 83
DRG: 025 | End: 2018-11-26
Payer: MEDICARE

## 2018-11-26 LAB
ALBUMIN SERPL-MCNC: 3.4 G/DL (ref 3.4–5)
ANION GAP SERPL CALCULATED.3IONS-SCNC: 12 MMOL/L (ref 3–16)
BASOPHILS ABSOLUTE: 0 K/UL (ref 0–0.2)
BASOPHILS RELATIVE PERCENT: 0.3 %
BUN BLDV-MCNC: 33 MG/DL (ref 7–20)
CALCIUM SERPL-MCNC: 9.4 MG/DL (ref 8.3–10.6)
CHLORIDE BLD-SCNC: 108 MMOL/L (ref 99–110)
CO2: 21 MMOL/L (ref 21–32)
CREAT SERPL-MCNC: 0.6 MG/DL (ref 0.6–1.2)
CULTURE, BLOOD 2: NORMAL
EKG ATRIAL RATE: 76 BPM
EKG DIAGNOSIS: NORMAL
EKG Q-T INTERVAL: 408 MS
EKG QRS DURATION: 70 MS
EKG QTC CALCULATION (BAZETT): 417 MS
EKG R AXIS: -80 DEGREES
EKG T AXIS: 78 DEGREES
EKG VENTRICULAR RATE: 63 BPM
EOSINOPHILS ABSOLUTE: 0 K/UL (ref 0–0.6)
EOSINOPHILS RELATIVE PERCENT: 0 %
GFR AFRICAN AMERICAN: >60
GFR NON-AFRICAN AMERICAN: >60
GLUCOSE BLD-MCNC: 120 MG/DL (ref 70–99)
HCT VFR BLD CALC: 42.3 % (ref 36–48)
HEMOGLOBIN: 13.4 G/DL (ref 12–16)
INR BLD: 1.19 (ref 0.86–1.14)
LYMPHOCYTES ABSOLUTE: 0.9 K/UL (ref 1–5.1)
LYMPHOCYTES RELATIVE PERCENT: 7.1 %
MAGNESIUM: 1.9 MG/DL (ref 1.8–2.4)
MCH RBC QN AUTO: 28 PG (ref 26–34)
MCHC RBC AUTO-ENTMCNC: 31.8 G/DL (ref 31–36)
MCV RBC AUTO: 88.2 FL (ref 80–100)
MONOCYTES ABSOLUTE: 0.8 K/UL (ref 0–1.3)
MONOCYTES RELATIVE PERCENT: 6.7 %
NEUTROPHILS ABSOLUTE: 10.8 K/UL (ref 1.7–7.7)
NEUTROPHILS RELATIVE PERCENT: 85.9 %
PDW BLD-RTO: 18.1 % (ref 12.4–15.4)
PHOSPHORUS: 3.7 MG/DL (ref 2.5–4.9)
PLATELET # BLD: 267 K/UL (ref 135–450)
PMV BLD AUTO: 8.6 FL (ref 5–10.5)
POTASSIUM SERPL-SCNC: 4.7 MMOL/L (ref 3.5–5.1)
PROTHROMBIN TIME: 13.6 SEC (ref 9.8–13)
RBC # BLD: 4.79 M/UL (ref 4–5.2)
SODIUM BLD-SCNC: 141 MMOL/L (ref 136–145)
WBC # BLD: 12.5 K/UL (ref 4–11)

## 2018-11-26 PROCEDURE — 6360000002 HC RX W HCPCS: Performed by: NURSE PRACTITIONER

## 2018-11-26 PROCEDURE — 36415 COLL VENOUS BLD VENIPUNCTURE: CPT

## 2018-11-26 PROCEDURE — 97535 SELF CARE MNGMENT TRAINING: CPT

## 2018-11-26 PROCEDURE — 93010 ELECTROCARDIOGRAM REPORT: CPT | Performed by: INTERNAL MEDICINE

## 2018-11-26 PROCEDURE — 97530 THERAPEUTIC ACTIVITIES: CPT

## 2018-11-26 PROCEDURE — 97110 THERAPEUTIC EXERCISES: CPT

## 2018-11-26 PROCEDURE — 85610 PROTHROMBIN TIME: CPT

## 2018-11-26 PROCEDURE — 6370000000 HC RX 637 (ALT 250 FOR IP): Performed by: INTERNAL MEDICINE

## 2018-11-26 PROCEDURE — 6370000000 HC RX 637 (ALT 250 FOR IP): Performed by: NURSE PRACTITIONER

## 2018-11-26 PROCEDURE — 83735 ASSAY OF MAGNESIUM: CPT

## 2018-11-26 PROCEDURE — 6370000000 HC RX 637 (ALT 250 FOR IP): Performed by: STUDENT IN AN ORGANIZED HEALTH CARE EDUCATION/TRAINING PROGRAM

## 2018-11-26 PROCEDURE — 97116 GAIT TRAINING THERAPY: CPT

## 2018-11-26 PROCEDURE — 2580000003 HC RX 258: Performed by: STUDENT IN AN ORGANIZED HEALTH CARE EDUCATION/TRAINING PROGRAM

## 2018-11-26 PROCEDURE — 1200000000 HC SEMI PRIVATE

## 2018-11-26 PROCEDURE — 97127 HC SP THER IVNTJ W/FOCUS COG FUNCJ: CPT

## 2018-11-26 PROCEDURE — 80069 RENAL FUNCTION PANEL: CPT

## 2018-11-26 PROCEDURE — 99233 SBSQ HOSP IP/OBS HIGH 50: CPT | Performed by: INTERNAL MEDICINE

## 2018-11-26 PROCEDURE — APPSS30 APP SPLIT SHARED TIME 16-30 MINUTES: Performed by: NURSE PRACTITIONER

## 2018-11-26 PROCEDURE — 85025 COMPLETE CBC W/AUTO DIFF WBC: CPT

## 2018-11-26 PROCEDURE — 92526 ORAL FUNCTION THERAPY: CPT

## 2018-11-26 RX ORDER — CEFAZOLIN SODIUM 2 G/50ML
2 SOLUTION INTRAVENOUS
Status: DISCONTINUED | OUTPATIENT
Start: 2018-11-27 | End: 2018-11-27

## 2018-11-26 RX ADMIN — DEXAMETHASONE 2 MG: 4 TABLET ORAL at 20:53

## 2018-11-26 RX ADMIN — METOPROLOL TARTRATE 25 MG: 25 TABLET ORAL at 08:49

## 2018-11-26 RX ADMIN — METOPROLOL TARTRATE 25 MG: 25 TABLET ORAL at 20:52

## 2018-11-26 RX ADMIN — Medication 10 ML: at 08:52

## 2018-11-26 RX ADMIN — LEVOTHYROXINE SODIUM 150 MCG: 150 TABLET ORAL at 06:48

## 2018-11-26 RX ADMIN — DEXAMETHASONE 2 MG: 4 TABLET ORAL at 08:49

## 2018-11-26 RX ADMIN — PANTOPRAZOLE SODIUM 40 MG: 40 TABLET, DELAYED RELEASE ORAL at 06:48

## 2018-11-26 RX ADMIN — POLYETHYLENE GLYCOL (3350) 17 G: 17 POWDER, FOR SOLUTION ORAL at 08:49

## 2018-11-26 RX ADMIN — ATORVASTATIN CALCIUM 20 MG: 20 TABLET, FILM COATED ORAL at 08:49

## 2018-11-26 RX ADMIN — Medication 10 ML: at 20:57

## 2018-11-26 ASSESSMENT — ENCOUNTER SYMPTOMS: SHORTNESS OF BREATH: 1

## 2018-11-26 ASSESSMENT — PAIN SCALES - GENERAL: PAINLEVEL_OUTOF10: 0

## 2018-11-26 NOTE — PROGRESS NOTES
throughout. No coughing, throat clearing or choking observed with any consistency. Goal 2: Pt/family will verbalize and/or demonstrate understanding of swallowing recommendations  11/22:  No family present. Pt educated to reason for visit, diet and strategy recommendations, recommendation for MBS. Pt with questionable full comprehension and recall. 11/25: Daughter present. Pt and daughter educated on visit reason, swallow tolerance, diet recommendation. Pt and daughter expressed understanding. All questions answered. 1126- goal met-  Pt educated to purpose of the visit and was encouraged to alert staff if difficulty with swallowing emerges. Pt stated comprehension. Goal 3: Pt will participate in MBS procedure as appropriate  GOAL MET 11/23/18    Patient/Family/Caregiver Education:   See under goal 2 above  . Compensatory Strategies: Alternate solids and liquids; Upright as possible for all oral intake;Eat/Feed slowly; Small bites/sips (Check for oral residue); Supervise and assist feed as needed       Plan:   Pt discharged from dysphagia services. Pt met all goals for therapy. Diet recommendations:  Dysphagia II mechanically altered, thin liquids cup, straw    DC recommendation:  No further follow up needed unless s/s aspiration emerge, make pt NPO and re-refer. Treatment: 10  D/W nursing  Needs met prior to leaving room, call button in reach.       Lyda Eisenmenger, [place], [address]  Speech-Language Pathologist  Pager [phone]      If patient is discharged prior to next treatment, this note will serve as the discharge summary

## 2018-11-26 NOTE — PROGRESS NOTES
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 female from [facility] for confusion / falls.  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  Patient was transferred to [facility] for neurosurgical evaluation and recommendations. Amalia Traylor has a history of normal pressure hydrocephalus and has a  shunt.  Patient denies any acute complaints at the present time. cardiology consult for bradycardia with pause (was told she had a 29 second pause but actually its a 2.9 sec pause) / asymptomatic/ BB and Seroquel dc'd      Hx Acute on chronic subdural hematoma Normal pressure hydrocephalus chronic status post shunt 2 years ago  Hypertension   A. Fib  Hypothyroid  Recent UTI     11/2016 us carotid no significant  stenosis      3/2018 CT head   Stable appearance of hydrocephalus involving mainly the lateral and third ventricles with right frontal approach ventricular shunt catheter in place. No interval change.     Echo 11/2016  EF WNL wnl      Stress test 10/16/17  1. Normal study without definitive areas of myocardial infarction or ischemia. 2. The overall left ventricular systolic function was normal without wall motion abnormalities.    3. Post stress EF is greater than 70.  4.  No prior study is available for comparison     EKG 11/19/18 Atrial fibrillation LAD poor R wave progression ?septal infarct nonspecific T wave abnormality  Trop<0.01       Current · ROS:   · Cardiovascular: Reviewed in HPI  · Allergic/Immunologic: No nasal congestion or hives. · All other ROS are reviewed and are unremarkable. Physical Exam:  General:    NAD  Skin:  Warm and dry  Chest:  Clear to auscultation, respiration easy  Cardiovascular:  Irregular S1S2  Abdomen:  Soft     Assessment    cardiology consult for bradycardia with pause (was told she had a 29 second pause but actually its a 2.9 sec pause) / asymptomatic / BB and Seroquel dc'd   Echo 11/2016  EF WNL wnl      [id number] Score for Atrial Fibrillation Stroke Risk    Risk   Factors   Component Value   C CHF No 0   H HTN No 0   A2 Age >= 76 Yes,  (80 y.o.) 2   D DM No 0   S2 Prior Stroke/TIA No 0   V Vascular Disease No 0   A Age 74-69 No,  (80 y.o.) 0   Sc Sex female 1     [id number]  Score   3   No anticoag      confusion / falls  Hx normal pressure hydrocephalus and has a  shunt  Evaluation at [place] revealed 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    Patient was [facility] for neurosurgical evaluation and recommendations. history of normal pressure hydrocephalus and has a  shunt.  Patient denies any acute complaints at the present time. cardiology consult for bradycardia with pause      11/2016 us carotid no significant  stenosis      3/2018 CT head   Stable appearance of hydrocephalus involving mainly the lateral and third ventricles with right frontal approach ventricular shunt catheter in place. No interval change.     Echo 11/2016  EF WNL wnl      Stress test 10/16/17  1. Normal study without definitive areas of myocardial infarction or ischemia. 2. The overall left ventricular systolic function was normal without wall motion abnormalities.    3. Post stress EF is greater than 70.  4.  No prior study is available for comparison     EKG 11/19/18 Atrial fibrillation LAD poor R wave progression ?septal infarct nonspecific T wave abnormality  Trop<0.01      HTN

## 2018-11-26 NOTE — PROGRESS NOTES
Progress Note    Admit Date: 11/19/2018  Day: 7  Diet: DIET DENTAL SOFT; Dysphagia II Mechanically Altered; Low Potassium    Interval history: 81 yo F who presented with worsening confusion and falls. Found to have AoC SDH in setting of ASA and plavix use. Tentative plan for SDH evacuation tomorrow 11/27    Subjective: Patient seen and examined this morning. No overnight events. Patient was resting comfortably in her chair. She is alert and oriented to person, place and situation. She denies headache, changes in vision, numbness/tingling. SDH evac likely tomorrow. Medications:     Scheduled Meds:   metoprolol tartrate  25 mg Oral BID    polyethylene glycol  17 g Oral Daily    pantoprazole  40 mg Oral QAM AC    dexamethasone  2 mg Oral 2 times per day    atorvastatin  20 mg Oral Daily    levothyroxine  150 mcg Oral Daily    sodium chloride flush  10 mL Intravenous 2 times per day     Continuous Infusions:  PRN Meds:hydrALAZINE, bisacodyl, sodium chloride flush, acetaminophen, ondansetron    Objective:   Vitals:   T-max:  Patient Vitals for the past 8 hrs:   BP Temp Temp src Pulse Resp SpO2   11/26/18 0750 (!) 163/76 97.6 °F (36.4 °C) Oral 74 17 99 %   11/26/18 0352 (!) 146/89 98.1 °F (36.7 °C) Oral 78 16 95 %       Intake/Output Summary (Last 24 hours) at 11/26/18 0935  Last data filed at 11/26/18 5209   Gross per 24 hour   Intake              250 ml   Output                0 ml   Net              250 ml       Physical Exam   Constitutional: She appears well-developed and well-nourished. No distress. HENT:   Head: Normocephalic and atraumatic. Eyes: Pupils are equal, round, and reactive to light. Neck: Normal range of motion. Cardiovascular:   Irregularly irregular rhythm. Regular rate. Pulmonary/Chest: Effort normal and breath sounds normal. No respiratory distress. She has no wheezes. Abdominal: Soft. Bowel sounds are normal.   Musculoskeletal: Normal range of motion.    Neurological: She

## 2018-11-26 NOTE — CARE COORDINATION
Case Management following patient for discharge planning and needs, received return phone call from Admissions Coordinator with [facility]. Patient has been accepted and bed will be available for patient once medically stable. Patient will need notification only, will need updated PT/OT notes. Patient to OR on 11/27 for evacuation of SDH. Please contact Case Management with any questions or concerns.     Thank Germán Tao RN Case Manager  4th Floor [facility] Care Unit  [phone]

## 2018-11-26 NOTE — PROGRESS NOTES
Within Functional Limits (To conversation)     Objective   Bed mobility  Supine to Sit: Minimal assistance (HOB elevated using rail)  Scooting: Minimal assistance     Transfers  Sit to Stand: Contact guard assistance  Stand to sit: Minimal Assistance (Cues for safe transfer)     Ambulation  Ambulation?: Yes  Ambulation 1  Device: Rolling Walker  Assistance: Minimal assistance;Contact guard assistance  Quality of Gait: Small B steps, tendency to lean and rotate to the R, flexed trunk  Distance: 60 feet     Balance  Sitting - Static:  (CGA due to lean to R at times)  Standing - Dynamic:  (CGA to min assist with walker)     Exercises  Comments: Pt performed seated B hip flexion, LAQ, AP x 10-15 with cues     Assessment   Body structures, Functions, Activity limitations: Decreased functional mobility   Assessment: Pt with overall improving mobility, although pt still requires assist for dynamic balance using wheeled walker and presents as a fall risk. Rec continued inpt PT at d/c to assist in returning pt to her PLOF. Will continue.   Treatment Diagnosis: impaired gait and transfers  Prognosis: Good  Patient Education: Pt educated in use of call light and able to verbalize understanding  REQUIRES PT FOLLOW UP: Yes  Activity Tolerance  Activity Tolerance: Patient Tolerated treatment well     AM-PAC Score  AM-PAC Inpatient Mobility Raw Score : 15  AM-PAC Inpatient T-Scale Score : 39.45  Mobility Inpatient CMS 0-100% Score: 57.7  Mobility Inpatient CMS G-Code Modifier : CK    Goals  Short term goals  Time Frame for Short term goals: discharge  Short term goal 1: Pt will transfer supine <--> sit with mod A x 1 goal met 11/22 - updated goal: pt will transfer supine<> sit with CGA x 1  ONGOING  Short term goal 2: Pt will transfer sit <--> stand with mod A x 1  MET 11/24  Revised Goal: Pt will transfer sit to stand with CGA  ONGOING  Short term goal 3: Pt will transfer bed <--> chair with mod A x 1  MET 11/24    Short term

## 2018-11-27 ENCOUNTER — APPOINTMENT (OUTPATIENT)
Dept: CT IMAGING | Age: 83
DRG: 025 | End: 2018-11-27
Attending: FAMILY MEDICINE
Payer: MEDICARE

## 2018-11-27 ENCOUNTER — ANESTHESIA (OUTPATIENT)
Dept: OPERATING ROOM | Age: 83
DRG: 025 | End: 2018-11-27
Payer: MEDICARE

## 2018-11-27 VITALS — SYSTOLIC BLOOD PRESSURE: 161 MMHG | DIASTOLIC BLOOD PRESSURE: 77 MMHG | OXYGEN SATURATION: 97 %

## 2018-11-27 LAB
ALBUMIN SERPL-MCNC: 3.4 G/DL (ref 3.4–5)
ANION GAP SERPL CALCULATED.3IONS-SCNC: 12 MMOL/L (ref 3–16)
BASOPHILS ABSOLUTE: 0 K/UL (ref 0–0.2)
BASOPHILS RELATIVE PERCENT: 0.1 %
BUN BLDV-MCNC: 35 MG/DL (ref 7–20)
CALCIUM SERPL-MCNC: 8.8 MG/DL (ref 8.3–10.6)
CHLORIDE BLD-SCNC: 108 MMOL/L (ref 99–110)
CO2: 21 MMOL/L (ref 21–32)
CREAT SERPL-MCNC: 0.8 MG/DL (ref 0.6–1.2)
EOSINOPHILS ABSOLUTE: 0 K/UL (ref 0–0.6)
EOSINOPHILS RELATIVE PERCENT: 0.1 %
GFR AFRICAN AMERICAN: >60
GFR NON-AFRICAN AMERICAN: >60
GLUCOSE BLD-MCNC: 121 MG/DL (ref 70–99)
HCT VFR BLD CALC: 38.7 % (ref 36–48)
HEMOGLOBIN: 12.4 G/DL (ref 12–16)
INR BLD: 1.14 (ref 0.86–1.14)
LYMPHOCYTES ABSOLUTE: 0.9 K/UL (ref 1–5.1)
LYMPHOCYTES RELATIVE PERCENT: 7.9 %
MAGNESIUM: 2 MG/DL (ref 1.8–2.4)
MCH RBC QN AUTO: 27.9 PG (ref 26–34)
MCHC RBC AUTO-ENTMCNC: 32.1 G/DL (ref 31–36)
MCV RBC AUTO: 86.8 FL (ref 80–100)
MONOCYTES ABSOLUTE: 0.9 K/UL (ref 0–1.3)
MONOCYTES RELATIVE PERCENT: 7.4 %
NEUTROPHILS ABSOLUTE: 10.1 K/UL (ref 1.7–7.7)
NEUTROPHILS RELATIVE PERCENT: 84.5 %
PDW BLD-RTO: 17.5 % (ref 12.4–15.4)
PHOSPHORUS: 2.9 MG/DL (ref 2.5–4.9)
PLATELET # BLD: 221 K/UL (ref 135–450)
PMV BLD AUTO: 9 FL (ref 5–10.5)
POTASSIUM SERPL-SCNC: 4.6 MMOL/L (ref 3.5–5.1)
PROTHROMBIN TIME: 13 SEC (ref 9.8–13)
RBC # BLD: 4.46 M/UL (ref 4–5.2)
SODIUM BLD-SCNC: 141 MMOL/L (ref 136–145)
WBC # BLD: 12 K/UL (ref 4–11)

## 2018-11-27 PROCEDURE — 2580000003 HC RX 258

## 2018-11-27 PROCEDURE — 7100000000 HC PACU RECOVERY - FIRST 15 MIN: Performed by: NEUROLOGICAL SURGERY

## 2018-11-27 PROCEDURE — 2580000003 HC RX 258: Performed by: NEUROLOGICAL SURGERY

## 2018-11-27 PROCEDURE — 009400Z DRAINAGE OF INTRACRANIAL SUBDURAL SPACE WITH DRAINAGE DEVICE, OPEN APPROACH: ICD-10-PCS | Performed by: NEUROLOGICAL SURGERY

## 2018-11-27 PROCEDURE — 3700000001 HC ADD 15 MINUTES (ANESTHESIA): Performed by: NEUROLOGICAL SURGERY

## 2018-11-27 PROCEDURE — 85610 PROTHROMBIN TIME: CPT

## 2018-11-27 PROCEDURE — 3600000004 HC SURGERY LEVEL 4 BASE: Performed by: NEUROLOGICAL SURGERY

## 2018-11-27 PROCEDURE — 85025 COMPLETE CBC W/AUTO DIFF WBC: CPT

## 2018-11-27 PROCEDURE — 80069 RENAL FUNCTION PANEL: CPT

## 2018-11-27 PROCEDURE — 36415 COLL VENOUS BLD VENIPUNCTURE: CPT

## 2018-11-27 PROCEDURE — 3700000000 HC ANESTHESIA ATTENDED CARE: Performed by: NEUROLOGICAL SURGERY

## 2018-11-27 PROCEDURE — 6370000000 HC RX 637 (ALT 250 FOR IP): Performed by: STUDENT IN AN ORGANIZED HEALTH CARE EDUCATION/TRAINING PROGRAM

## 2018-11-27 PROCEDURE — 2500000003 HC RX 250 WO HCPCS: Performed by: NEUROLOGICAL SURGERY

## 2018-11-27 PROCEDURE — 7100000001 HC PACU RECOVERY - ADDTL 15 MIN: Performed by: NEUROLOGICAL SURGERY

## 2018-11-27 PROCEDURE — 2500000003 HC RX 250 WO HCPCS

## 2018-11-27 PROCEDURE — 6370000000 HC RX 637 (ALT 250 FOR IP): Performed by: INTERNAL MEDICINE

## 2018-11-27 PROCEDURE — 6370000000 HC RX 637 (ALT 250 FOR IP): Performed by: NURSE PRACTITIONER

## 2018-11-27 PROCEDURE — 2709999900 HC NON-CHARGEABLE SUPPLY: Performed by: NEUROLOGICAL SURGERY

## 2018-11-27 PROCEDURE — 6370000000 HC RX 637 (ALT 250 FOR IP): Performed by: NEUROLOGICAL SURGERY

## 2018-11-27 PROCEDURE — 99233 SBSQ HOSP IP/OBS HIGH 50: CPT | Performed by: NURSE PRACTITIONER

## 2018-11-27 PROCEDURE — 3600000014 HC SURGERY LEVEL 4 ADDTL 15MIN: Performed by: NEUROLOGICAL SURGERY

## 2018-11-27 PROCEDURE — C1713 ANCHOR/SCREW BN/BN,TIS/BN: HCPCS | Performed by: NEUROLOGICAL SURGERY

## 2018-11-27 PROCEDURE — 83735 ASSAY OF MAGNESIUM: CPT

## 2018-11-27 PROCEDURE — 1200000000 HC SEMI PRIVATE

## 2018-11-27 PROCEDURE — 2720000010 HC SURG SUPPLY STERILE: Performed by: NEUROLOGICAL SURGERY

## 2018-11-27 PROCEDURE — 2580000003 HC RX 258: Performed by: STUDENT IN AN ORGANIZED HEALTH CARE EDUCATION/TRAINING PROGRAM

## 2018-11-27 PROCEDURE — 6360000002 HC RX W HCPCS

## 2018-11-27 PROCEDURE — 6360000002 HC RX W HCPCS: Performed by: NURSE PRACTITIONER

## 2018-11-27 DEVICE — PLATE BNE L12MM THK0.4MM 2 H CRANIOMAXILLOFACIAL BILAT BLU: Type: IMPLANTABLE DEVICE | Site: CRANIAL | Status: FUNCTIONAL

## 2018-11-27 DEVICE — SCREW BNE L4MM DIA1.5MM CRANIOFACIAL TI SELF DRL: Type: IMPLANTABLE DEVICE | Site: CRANIAL | Status: FUNCTIONAL

## 2018-11-27 RX ORDER — MIDAZOLAM HYDROCHLORIDE 5 MG/ML
INJECTION INTRAMUSCULAR; INTRAVENOUS PRN
Status: DISCONTINUED | OUTPATIENT
Start: 2018-11-27 | End: 2018-11-27 | Stop reason: SDUPTHER

## 2018-11-27 RX ORDER — SODIUM CHLORIDE 9 MG/ML
INJECTION, SOLUTION INTRAVENOUS CONTINUOUS
Status: CANCELLED | OUTPATIENT
Start: 2018-11-27

## 2018-11-27 RX ORDER — ONDANSETRON 2 MG/ML
4 INJECTION INTRAMUSCULAR; INTRAVENOUS
Status: DISCONTINUED | OUTPATIENT
Start: 2018-11-27 | End: 2018-11-27

## 2018-11-27 RX ORDER — OXYCODONE HYDROCHLORIDE AND ACETAMINOPHEN 5; 325 MG/1; MG/1
2 TABLET ORAL PRN
Status: DISCONTINUED | OUTPATIENT
Start: 2018-11-27 | End: 2018-11-27

## 2018-11-27 RX ORDER — LIDOCAINE HYDROCHLORIDE AND EPINEPHRINE 10; 10 MG/ML; UG/ML
INJECTION, SOLUTION INFILTRATION; PERINEURAL PRN
Status: DISCONTINUED | OUTPATIENT
Start: 2018-11-27 | End: 2018-11-27 | Stop reason: HOSPADM

## 2018-11-27 RX ORDER — LABETALOL HYDROCHLORIDE 5 MG/ML
5 INJECTION, SOLUTION INTRAVENOUS EVERY 10 MIN PRN
Status: DISCONTINUED | OUTPATIENT
Start: 2018-11-27 | End: 2018-11-27

## 2018-11-27 RX ORDER — OXYCODONE HYDROCHLORIDE AND ACETAMINOPHEN 5; 325 MG/1; MG/1
1 TABLET ORAL PRN
Status: DISCONTINUED | OUTPATIENT
Start: 2018-11-27 | End: 2018-11-27

## 2018-11-27 RX ORDER — HYDRALAZINE HYDROCHLORIDE 20 MG/ML
5 INJECTION INTRAMUSCULAR; INTRAVENOUS EVERY 10 MIN PRN
Status: DISCONTINUED | OUTPATIENT
Start: 2018-11-27 | End: 2018-11-27

## 2018-11-27 RX ORDER — PROPOFOL 10 MG/ML
INJECTION, EMULSION INTRAVENOUS PRN
Status: DISCONTINUED | OUTPATIENT
Start: 2018-11-27 | End: 2018-11-27 | Stop reason: SDUPTHER

## 2018-11-27 RX ORDER — CEFAZOLIN SODIUM 1 G/3ML
INJECTION, POWDER, FOR SOLUTION INTRAMUSCULAR; INTRAVENOUS PRN
Status: DISCONTINUED | OUTPATIENT
Start: 2018-11-27 | End: 2018-11-27 | Stop reason: SDUPTHER

## 2018-11-27 RX ORDER — FENTANYL CITRATE 50 UG/ML
50 INJECTION, SOLUTION INTRAMUSCULAR; INTRAVENOUS EVERY 5 MIN PRN
Status: DISCONTINUED | OUTPATIENT
Start: 2018-11-27 | End: 2018-11-27

## 2018-11-27 RX ORDER — SODIUM CHLORIDE, SODIUM LACTATE, POTASSIUM CHLORIDE, CALCIUM CHLORIDE 600; 310; 30; 20 MG/100ML; MG/100ML; MG/100ML; MG/100ML
INJECTION, SOLUTION INTRAVENOUS CONTINUOUS PRN
Status: DISCONTINUED | OUTPATIENT
Start: 2018-11-27 | End: 2018-11-27 | Stop reason: SDUPTHER

## 2018-11-27 RX ORDER — PROPOFOL 10 MG/ML
INJECTION, EMULSION INTRAVENOUS CONTINUOUS PRN
Status: DISCONTINUED | OUTPATIENT
Start: 2018-11-27 | End: 2018-11-27 | Stop reason: SDUPTHER

## 2018-11-27 RX ORDER — HYDRALAZINE HYDROCHLORIDE 20 MG/ML
10 INJECTION INTRAMUSCULAR; INTRAVENOUS EVERY 4 HOURS PRN
Status: DISCONTINUED | OUTPATIENT
Start: 2018-11-27 | End: 2018-11-29 | Stop reason: HOSPADM

## 2018-11-27 RX ORDER — HYDROCODONE BITARTRATE AND ACETAMINOPHEN 5; 325 MG/1; MG/1
1 TABLET ORAL EVERY 8 HOURS PRN
Status: DISCONTINUED | OUTPATIENT
Start: 2018-11-27 | End: 2018-11-29 | Stop reason: HOSPADM

## 2018-11-27 RX ORDER — ACETAMINOPHEN 325 MG/1
650 TABLET ORAL EVERY 4 HOURS PRN
Status: DISCONTINUED | OUTPATIENT
Start: 2018-11-27 | End: 2018-11-27 | Stop reason: SDUPTHER

## 2018-11-27 RX ORDER — EPHEDRINE SULFATE 50 MG/ML
INJECTION, SOLUTION INTRAVENOUS PRN
Status: DISCONTINUED | OUTPATIENT
Start: 2018-11-27 | End: 2018-11-27 | Stop reason: SDUPTHER

## 2018-11-27 RX ORDER — LABETALOL HYDROCHLORIDE 5 MG/ML
10 INJECTION, SOLUTION INTRAVENOUS EVERY 4 HOURS PRN
Status: DISCONTINUED | OUTPATIENT
Start: 2018-11-27 | End: 2018-11-29 | Stop reason: HOSPADM

## 2018-11-27 RX ORDER — FENTANYL CITRATE 50 UG/ML
25 INJECTION, SOLUTION INTRAMUSCULAR; INTRAVENOUS EVERY 5 MIN PRN
Status: DISCONTINUED | OUTPATIENT
Start: 2018-11-27 | End: 2018-11-27

## 2018-11-27 RX ORDER — ONDANSETRON 2 MG/ML
INJECTION INTRAMUSCULAR; INTRAVENOUS PRN
Status: DISCONTINUED | OUTPATIENT
Start: 2018-11-27 | End: 2018-11-27 | Stop reason: SDUPTHER

## 2018-11-27 RX ORDER — LEVETIRACETAM 500 MG/5ML
INJECTION, SOLUTION, CONCENTRATE INTRAVENOUS PRN
Status: DISCONTINUED | OUTPATIENT
Start: 2018-11-27 | End: 2018-11-27 | Stop reason: SDUPTHER

## 2018-11-27 RX ORDER — LABETALOL HYDROCHLORIDE 5 MG/ML
20 INJECTION, SOLUTION INTRAVENOUS EVERY 4 HOURS PRN
Status: DISCONTINUED | OUTPATIENT
Start: 2018-11-27 | End: 2018-11-29 | Stop reason: HOSPADM

## 2018-11-27 RX ORDER — FENTANYL CITRATE 50 UG/ML
INJECTION, SOLUTION INTRAMUSCULAR; INTRAVENOUS PRN
Status: DISCONTINUED | OUTPATIENT
Start: 2018-11-27 | End: 2018-11-27 | Stop reason: SDUPTHER

## 2018-11-27 RX ADMIN — METOPROLOL TARTRATE 25 MG: 25 TABLET ORAL at 20:10

## 2018-11-27 RX ADMIN — MIDAZOLAM HYDROCHLORIDE 1 MG: 5 INJECTION INTRAMUSCULAR; INTRAVENOUS at 07:45

## 2018-11-27 RX ADMIN — Medication 10 ML: at 20:11

## 2018-11-27 RX ADMIN — METOPROLOL TARTRATE 25 MG: 25 TABLET ORAL at 11:24

## 2018-11-27 RX ADMIN — ONDANSETRON 4 MG: 2 INJECTION INTRAMUSCULAR; INTRAVENOUS at 08:55

## 2018-11-27 RX ADMIN — ACETAMINOPHEN 650 MG: 325 TABLET ORAL at 12:10

## 2018-11-27 RX ADMIN — PROPOFOL 50 MG: 10 INJECTION, EMULSION INTRAVENOUS at 07:45

## 2018-11-27 RX ADMIN — FENTANYL CITRATE 25 MCG: 50 INJECTION INTRAMUSCULAR; INTRAVENOUS at 07:40

## 2018-11-27 RX ADMIN — LEVETIRACETAM 1000 MG: 100 INJECTION, SOLUTION INTRAVENOUS at 08:20

## 2018-11-27 RX ADMIN — EPHEDRINE SULFATE 5 MG: 50 INJECTION, SOLUTION INTRAMUSCULAR; INTRAVENOUS; SUBCUTANEOUS at 08:28

## 2018-11-27 RX ADMIN — HYDRALAZINE HYDROCHLORIDE 10 MG: 20 INJECTION INTRAMUSCULAR; INTRAVENOUS at 12:23

## 2018-11-27 RX ADMIN — SODIUM CHLORIDE, SODIUM LACTATE, POTASSIUM CHLORIDE, AND CALCIUM CHLORIDE: 600; 310; 30; 20 INJECTION, SOLUTION INTRAVENOUS at 09:20

## 2018-11-27 RX ADMIN — LEVOTHYROXINE SODIUM 150 MCG: 150 TABLET ORAL at 06:35

## 2018-11-27 RX ADMIN — POLYETHYLENE GLYCOL (3350) 17 G: 17 POWDER, FOR SOLUTION ORAL at 11:25

## 2018-11-27 RX ADMIN — MIDAZOLAM HYDROCHLORIDE 1 MG: 5 INJECTION INTRAMUSCULAR; INTRAVENOUS at 07:40

## 2018-11-27 RX ADMIN — ACETAMINOPHEN 650 MG: 325 TABLET ORAL at 20:10

## 2018-11-27 RX ADMIN — SODIUM CHLORIDE, SODIUM LACTATE, POTASSIUM CHLORIDE, AND CALCIUM CHLORIDE: 600; 310; 30; 20 INJECTION, SOLUTION INTRAVENOUS at 07:10

## 2018-11-27 RX ADMIN — PROPOFOL 80 MCG/KG/MIN: 10 INJECTION, EMULSION INTRAVENOUS at 07:40

## 2018-11-27 RX ADMIN — PANTOPRAZOLE SODIUM 40 MG: 40 TABLET, DELAYED RELEASE ORAL at 06:38

## 2018-11-27 RX ADMIN — CEFAZOLIN SODIUM 2000 MG: 1 POWDER, FOR SOLUTION INTRAMUSCULAR; INTRAVENOUS at 07:45

## 2018-11-27 RX ADMIN — ATORVASTATIN CALCIUM 20 MG: 20 TABLET, FILM COATED ORAL at 11:24

## 2018-11-27 ASSESSMENT — PULMONARY FUNCTION TESTS
PIF_VALUE: 1
PIF_VALUE: 14
PIF_VALUE: 1
PIF_VALUE: 17
PIF_VALUE: 1
PIF_VALUE: 23
PIF_VALUE: 12
PIF_VALUE: 1
PIF_VALUE: 17
PIF_VALUE: 1
PIF_VALUE: 17
PIF_VALUE: 17
PIF_VALUE: 12
PIF_VALUE: 4
PIF_VALUE: 32
PIF_VALUE: 14
PIF_VALUE: 12
PIF_VALUE: 17
PIF_VALUE: 1
PIF_VALUE: 17
PIF_VALUE: 1
PIF_VALUE: 2
PIF_VALUE: 1
PIF_VALUE: 1
PIF_VALUE: 17
PIF_VALUE: 14
PIF_VALUE: 17
PIF_VALUE: 17
PIF_VALUE: 15
PIF_VALUE: 16
PIF_VALUE: 14
PIF_VALUE: 17
PIF_VALUE: 14
PIF_VALUE: 12
PIF_VALUE: 17
PIF_VALUE: 1
PIF_VALUE: 12
PIF_VALUE: 12
PIF_VALUE: 17
PIF_VALUE: 1
PIF_VALUE: 17
PIF_VALUE: 17
PIF_VALUE: 1
PIF_VALUE: 10
PIF_VALUE: 17
PIF_VALUE: 11
PIF_VALUE: 17
PIF_VALUE: 17
PIF_VALUE: 1
PIF_VALUE: 17
PIF_VALUE: 14
PIF_VALUE: 17
PIF_VALUE: 1
PIF_VALUE: 1
PIF_VALUE: 11
PIF_VALUE: 17
PIF_VALUE: 14
PIF_VALUE: 14
PIF_VALUE: 17
PIF_VALUE: 1
PIF_VALUE: 14
PIF_VALUE: 17
PIF_VALUE: 12
PIF_VALUE: 10
PIF_VALUE: 12
PIF_VALUE: 17
PIF_VALUE: 1
PIF_VALUE: 14
PIF_VALUE: 1
PIF_VALUE: 17
PIF_VALUE: 16
PIF_VALUE: 10
PIF_VALUE: 12
PIF_VALUE: 17
PIF_VALUE: 22
PIF_VALUE: 17
PIF_VALUE: 1
PIF_VALUE: 17
PIF_VALUE: 16
PIF_VALUE: 17
PIF_VALUE: 12
PIF_VALUE: 17
PIF_VALUE: 12
PIF_VALUE: 12
PIF_VALUE: 1
PIF_VALUE: 17
PIF_VALUE: 17
PIF_VALUE: 1
PIF_VALUE: 17
PIF_VALUE: 10
PIF_VALUE: 12
PIF_VALUE: 16
PIF_VALUE: 14
PIF_VALUE: 12
PIF_VALUE: 17
PIF_VALUE: 1
PIF_VALUE: 10
PIF_VALUE: 17
PIF_VALUE: 1
PIF_VALUE: 12
PIF_VALUE: 17
PIF_VALUE: 2
PIF_VALUE: 11
PIF_VALUE: 17
PIF_VALUE: 17
PIF_VALUE: 1
PIF_VALUE: 12
PIF_VALUE: 1
PIF_VALUE: 17
PIF_VALUE: 16
PIF_VALUE: 1
PIF_VALUE: 17
PIF_VALUE: 11
PIF_VALUE: 12

## 2018-11-27 ASSESSMENT — PAIN SCALES - GENERAL
PAINLEVEL_OUTOF10: 0
PAINLEVEL_OUTOF10: 3
PAINLEVEL_OUTOF10: 0
PAINLEVEL_OUTOF10: 3

## 2018-11-27 ASSESSMENT — PAIN DESCRIPTION - PROGRESSION
CLINICAL_PROGRESSION: GRADUALLY WORSENING
CLINICAL_PROGRESSION: GRADUALLY WORSENING
CLINICAL_PROGRESSION: GRADUALLY IMPROVING

## 2018-11-27 ASSESSMENT — PAIN DESCRIPTION - LOCATION
LOCATION: HEAD
LOCATION: HEAD

## 2018-11-27 ASSESSMENT — PAIN DESCRIPTION - ORIENTATION
ORIENTATION: OTHER (COMMENT)
ORIENTATION: OTHER (COMMENT)

## 2018-11-27 ASSESSMENT — PAIN DESCRIPTION - FREQUENCY
FREQUENCY: INTERMITTENT
FREQUENCY: INTERMITTENT

## 2018-11-27 ASSESSMENT — PAIN DESCRIPTION - ONSET
ONSET: ON-GOING
ONSET: GRADUAL

## 2018-11-27 ASSESSMENT — PAIN DESCRIPTION - PAIN TYPE
TYPE: SURGICAL PAIN
TYPE: SURGICAL PAIN

## 2018-11-27 ASSESSMENT — PAIN DESCRIPTION - DESCRIPTORS
DESCRIPTORS: ACHING
DESCRIPTORS: ACHING

## 2018-11-27 NOTE — OP NOTE
region just medial to the superior temporal line while on the right the incision was marked in the parietal region posterior to the ventriculoperitoneal shunt valve. The areas were shaved and injected with 1% Lidocaine with 1:100,000 Epinephrine. Intravenous antibiotics (Ancef), Decadron, and Keppra were given by anesthesia. A time out was completed and the surgical sites were prepped and draped in the usual sterile fashion. The skin at both incisions was sharply incised with a #10 scalpel and subgaleal hemostasis was obtained with Bovie electrocautery. The periosteum was scraped to the side using a periosteal elevator. Next, a 4 mm cutting bur bit on a Vioozer drill was used to drill small bur holes at both incisions. Dura was dissected away from the bone using a [facility] and the craniotomies were turned with the B1 footplate. The dura remained intact. Hemostasis was obtained using bipolar cautery in the epidural space. Once good hemostasis was obtained, 7-flat KIRAN drains were passed at each of the craniotomy sites through the skin with the plan of placing them as soon as the dura was opened. The dura was then opened bilaterally in cruciate fashion with a #11 scalpel. Acute-on-chronic subdural hematoma fluid was released under pressure on both sides. The KIRAN drains were passed from anterior to posterior, without resistance, using a [place]field 3 to guide the trajectory. The subdural space was then copiously irrigated with warm saline solution through both craniotomies. A 14-Albanian red rubber catheter was used to gently irrigate within the subdural space and remove remaining clot. Copious irrigation was used until returning irrigation was clear and colorless. Finally, after placing a large piece of gelfoam in the epidural space, both bone flaps were secured with [employer] Craniofacial miniplates and self-tapping 4mm screws.  Special attention was paid to allow unencumbered removal of the KIRAN drains. The wounds were then copiously irrigated with Bacitracin solution. The incisions were closed in layers using 2-0 Vicryl sutures in the galeal layer, 2-0 Vicryl sutures in the subcutaneous tissues, and staples in the skin. The wounds were then dressed with antibiotic ointment, Telfa, and Medipore tape. All needle, sponge, and instrument counts were correct. The patient was moved back to a hospital bed and taken to the PACU in stable condition. I affirm in accordance with CMS regulations that I was present and scrubbed for all critical portions of the case. DRAINS:   1. Bilateral subdural KIRAN drains attached to bile collection bags for passive drainage    IMPLANTS:   1. Synthes cranial plating system    SPECIMEN: None.     COMPLICATIONS: None

## 2018-11-27 NOTE — PROGRESS NOTES
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 [facility] for confusion Clydia Coombe.  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  Patient was transferred to [facility] for neurosurgical evaluation and recommendations. Iqra Crespo has a history of normal pressure hydrocephalus and has a  shunt.  Patient denies any acute complaints at the present time. cardiology consult for bradycardia with pause (was told she had a 29 second pause but actually its a 2.9 sec pause) / asymptomatic/ BB and Seroquel dc'd      Hx Acute on chronic subdural hematoma Normal pressure hydrocephalus chronic status post shunt 2 years ago  Hypertension   A. Fib  Hypothyroid  Recent UTI     11/2016 us carotid no significant  stenosis      3/2018 CT head   Stable appearance of hydrocephalus involving mainly the lateral and third ventricles with right frontal approach ventricular shunt catheter in place. No interval change.     Echo 11/2016  EF WNL wnl      Stress test 10/16/17  1. Normal study without definitive areas of myocardial infarction or ischemia. 2. The overall left ventricular systolic function was normal without wall motion abnormalities.    3. Post stress EF is greater than 70.  4.  No prior study is available for comparison     EKG 11/19/18 Atrial fibrillation LAD poor R wave progression ?septal stress EF is greater than 70.  4. No prior study is available for comparison     EKG 11/19/18 Atrial fibrillation LAD poor R wave progression ?septal infarct nonspecific T wave abnormality  Trop<0.01      HTN   Optional     Hypothyroid  Do TSH       Keep magnesium >2  potassium >3.5   sodium >135      HLD  Lipitor     SAH / hx NPH (history of normal pressure hydrocephalus and has a  shunt)  CT 11/25/18   1. Very large bilateral subdural hematomas with slight enlargement of the subdural hematoma along the posterior falx.    2. Slight enlargement of the ventricles and comparison to previous exam with stable ventriculostomy shunt catheter.      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

## 2018-11-27 NOTE — PROGRESS NOTES
Patient arrived back to 5504, neuro checks WNL and baseline for patient at this time. Patient is oriented to self only, patient denies any numbness or tingling, see neuro assessment. Patient complains of a mild headache. BP is slightly elevated, scheduled metoprolol administered, will reassess. Drains and surgical sites are CD&I, right drain has small amount of serosanguinous drainage to it. Pt tolerating liquids and placed back on previous diet from before surgery per verbal order from medical resident. Patient reoriented to room and call light. Belongings and call light within reach, bed alarm and avasys on for safety. Will continue to monitor.

## 2018-11-27 NOTE — PROGRESS NOTES
Speech Language Pathology  Speech/Dysphagia - hold    Chart reviewed, pt off floor for surgery at this time. Will follow up as pt appropriate and schedule allows. Venkatesh Rahman M.S./[facility]-SLP #[id number]  Pg.  # [phone]

## 2018-11-27 NOTE — PROGRESS NOTES
Neurosurgery Progress Note    I saw and examined [place] on 11/26/18. No acute events overnight. Neurologically stable with GCS E-4, V-4 (oriented to self only), M-6 (follows appendicular commands, strength 4/5 nonfocal). Reviewed CT head performed yesterday. A/P: 80 y. o. female w/stable large bilateral a/cSDH    -Neuro stable  -Continued Decadron 2mg BID  -PPI, SSI while on steroids  -Maintain SBP < 160 mm Hg  -Maintain platelets > 135P, INR < 1.4  -NPO at midnight  -Preop labs   -Reprogram VPS to 180 given progression in hydrocephalus without corresponding change in SDH   -Will plan to OR tomorrow for bilateral trephine evacuations     Marcelle Mcnair MD, PhD  [address], [address], [address] [phone] (c), [phone] (o)

## 2018-11-27 NOTE — PROGRESS NOTES
Pt is alert and oriented x2-3. VSS. Pt is intermittently incontinent. Pt  ambulates with assist of one,walker, and gait belt. Pt has been NPO since midnight. Pt is in A-fib on telemetry. Camera in p;lace for safety, all fall precautions in place. Will continue to monitor.

## 2018-11-27 NOTE — ANESTHESIA PRE PROCEDURE
Department of Anesthesiology  Preprocedure Note       Name:  René Rice   Age:  80 y.o.  :  1933                                          MRN:  [id number]         Date:  2018      Surgeon: Justin Castorena):  Yfn Carrillo MD    Procedure: BILATERAL TREPHINE CRANIOTOMIES FOR SUBDURAL EVACUATION (Bilateral )    Medications prior to admission:   Prior to Admission medications    Medication Sig Start Date End Date Taking? Authorizing Provider   aspirin 81 MG tablet Take 81 mg by mouth daily   Yes Historical Provider, MD   calcium carbonate 600 MG TABS tablet Take 2.5 tablets by mouth daily   Yes Historical Provider, MD   furosemide (LASIX) 20 MG tablet Take 20 mg by mouth daily   Yes Historical Provider, MD   levothyroxine (SYNTHROID) 150 MCG tablet Take 150 mcg by mouth Daily   Yes Historical Provider, MD   Multiple Vitamins-Minerals (THERAPEUTIC MULTIVITAMIN-MINERALS) tablet Take 1 tablet by mouth daily   Yes Historical Provider, MD   hydrochlorothiazide (HYDRODIURIL) 25 MG tablet Take 25 mg by mouth daily. Yes Historical Provider, MD   clopidogrel (PLAVIX) 75 MG tablet Take 75 mg by mouth daily    Historical Provider, MD   ertapenem (INVANZ) 1 GM injection Inject 1,000 mg into the muscle every 24 hours Until 2018 for uti    Historical Provider, MD   metoprolol succinate (TOPROL XL) 50 MG extended release tablet Take 50 mg by mouth daily    Historical Provider, MD   Mirabegron ER 25 MG TB24 Take 25 mg by mouth daily    Historical Provider, MD   nystatin (MYCOSTATIN) 545597 UNIT/GM powder Apply topically 4 times daily Apply topically 4 times daily. Historical Provider, MD   atorvastatin (LIPITOR) 20 MG tablet Take 20 mg by mouth daily.     Historical Provider, MD       Current medications:    Current Facility-Administered Medications   Medication Dose Route Frequency Provider Last Rate Last Dose    [START ON 2018] ceFAZolin (ANCEF) 2 g in dextrose 3 % 50 mL IVPB (duplex)  2 g Intravenous On Call to [address], APRN - CNP        hydrALAZINE (APRESOLINE) injection 10 mg  10 mg Intravenous Q6H PRN Chris Robertson MD   10 mg at 11/25/18 0920    metoprolol tartrate (LOPRESSOR) tablet 25 mg  25 mg Oral BID Virginia Lucas, APRN - CNP   25 mg at 11/26/18 0849    polyethylene glycol (GLYCOLAX) packet 17 g  17 g Oral Daily Norma Churchill MD   17 g at 11/26/18 0849    bisacodyl (DULCOLAX) suppository 10 mg  10 mg Rectal Daily PRN Norma Churchill MD        pantoprazole (PROTONIX) tablet 40 mg  40 mg Oral QAM  Joanna Sol MD   40 mg at 11/26/18 0648    dexamethasone (DECADRON) tablet 2 mg  2 mg Oral 2 times per day Ada Woods, APRN - CNP   2 mg at 11/26/18 1339    atorvastatin (LIPITOR) tablet 20 mg  20 mg Oral Daily Annika White MD   20 mg at 11/26/18 0849    levothyroxine (SYNTHROID) tablet 150 mcg  150 mcg Oral Daily Annika White MD   150 mcg at 11/26/18 5570    sodium chloride flush 0.9 % injection 10 mL  10 mL Intravenous 2 times per day Annika White MD   10 mL at 11/26/18 0625    sodium chloride flush 0.9 % injection 10 mL  10 mL Intravenous PRN Annika White MD        acetaminophen (TYLENOL) tablet 650 mg  650 mg Oral Q4H PRN Annika White MD        ondansetron [facility]) injection 4 mg  4 mg Intravenous Q6H PRN Annika White MD           Allergies:  No Known Allergies    Problem List:    Patient Active Problem List   Diagnosis Code    Subdural hematoma ([facility] [address] S06.5X9A    Atrial fibrillation with slow ventricular response ([address] I48.91       Past Medical History:        Diagnosis Date    Atrial fibrillation ([address]     Dyspnea     Hereditary and idiopathic neuropathy     Hydrocephalus     Hyperlipidemia     Hypertension     Localized edema     Muscle weakness (generalized)     Syncope and collapse     Thyroid disease     Unspecified kidney failure     UTI (urinary tract infection)        Past Surgical History:        Procedure HCGQUANT     ABGs: No results found for: PHART, PO2ART, JUX3EKF, IAM4INI, BEART, [id number]     Type & Screen (If Applicable):  No results found for: LABABO, [address]    Anesthesia Evaluation  Patient summary reviewed no history of anesthetic complications:   Airway: Mallampati: II  TM distance: >3 FB   Neck ROM: full  Mouth opening: > = 3 FB Dental:          Pulmonary:normal exam    (+) shortness of breath:                             Cardiovascular:    (+) hypertension:, dysrhythmias: atrial fibrillation, hyperlipidemia        Rhythm: irregular    Echocardiogram reviewed  Stress test reviewed  Cleared by cardiology     Beta Blocker:  Dose within 24 Hrs      ROS comment: Stress test 10/16/17  1. Normal study without definitive areas of myocardial infarction or ischemia. 2. The overall left ventricular systolic function was normal without wall motion abnormalities.    3. Post stress EF is greater than 70.  4. No prior study is available for comparison     Neuro/Psych:                ROS comment: Hydrocephalus  Mild right-sided weakness of lower extremities. Left side normal strength of upper and lower extremities. Acute on chronic bilateral SDH GI/Hepatic/Renal:             Endo/Other:    (+) hypothyroidism::., .                 Abdominal:           Vascular:                                        Anesthesia Plan      general     ASA 3     (Will need stress dose steroids)  Induction: intravenous. MIPS: Postoperative opioids intended and Prophylactic antiemetics administered. Anesthetic plan and risks discussed with patient.                       Mayo Schulte MD   11/26/2018

## 2018-11-27 NOTE — PROGRESS NOTES
Progress Note    Admit Date: 11/19/2018  Day: 8  Diet: DIET DENTAL SOFT; Low Potassium    Interval history: 79 yo F who presented with worsening confusion and falls. Found to have AoC SDH in setting of ASA and plavix use. Subjective: Patient seen and examined at bedside this morning. No overnight events. She underwent bilateral trephine evacuations this morning and tolerated procedure well. She is alert and oriented to person and place. Endorses mild headache, otherwise no complaints at this time. Medications:     Scheduled Meds:   metoprolol tartrate  25 mg Oral BID    polyethylene glycol  17 g Oral Daily    pantoprazole  40 mg Oral QAM AC    atorvastatin  20 mg Oral Daily    levothyroxine  150 mcg Oral Daily    sodium chloride flush  10 mL Intravenous 2 times per day     Continuous Infusions:  PRN Meds:HYDROcodone 5 mg - acetaminophen, bisacodyl, sodium chloride flush, acetaminophen, ondansetron    Objective:   Vitals:   T-max:  Patient Vitals for the past 8 hrs:   BP Temp Temp src Pulse Resp SpO2   11/27/18 1115 (!) 166/82 96.1 °F (35.6 °C) Axillary 66 18 96 %   11/27/18 1045 (!) 158/89 97 °F (36.1 °C) Temporal 70 17 100 %   11/27/18 1015 (!) 153/84 - - 66 15 100 %   11/27/18 1000 (!) 143/73 - - 57 19 100 %   11/27/18 0955 - - - 55 19 99 %   11/27/18 0945 135/72 - - 59 19 100 %   11/27/18 0940 (!) 145/58 - - 61 21 99 %   11/27/18 0935 (!) 155/72 96.8 °F (36 °C) Temporal 66 17 99 %       Intake/Output Summary (Last 24 hours) at 11/27/18 1148  Last data filed at 11/27/18 0920   Gross per 24 hour   Intake             1240 ml   Output                0 ml   Net             1240 ml       Physical Exam   Constitutional: She appears well-developed and well-nourished. No distress. HENT:   EVD drain in place. Head bandage clean dry and intact. Eyes: Pupils are equal, round, and reactive to light. EOM are normal.   Neck: Normal range of motion. Cardiovascular: Normal rate.     Irregularly irregular rhythm. Pulmonary/Chest: Effort normal and breath sounds normal.   Abdominal: Soft. Bowel sounds are normal.   Musculoskeletal: Normal range of motion. Neurological: She is alert. Oriented to person and place. No obvious CN deficits. Chronic right sided weakness. Skin: Skin is warm and dry. Psychiatric: She has a normal mood and affect. LABS:    CBC: Recent Labs      11/25/18 0509 11/26/18 0522 11/27/18 0529   WBC  10.7  12.5*  12.0*   HGB  12.1  13.4  12.4   HCT  37.4  42.3  38.7   PLT  231  267  221   MCV  87.1  88.2  86.8     Renal:  Recent Labs      11/25/18 0509 11/26/18 0522 11/27/18 0529   NA  143  141  141   K  4.7  4.7  4.6   CL  110  108  108   CO2  22  21  21   BUN  27*  33*  35*   CREATININE  0.6  0.6  0.8   GLUCOSE  133*  120*  121*   CALCIUM  8.9  9.4  8.8   MG  1.90  1.90  2.00   PHOS  3.4  3.7  2.9   ANIONGAP  11  12  12     Hepatic: Recent Labs      11/25/18 0509 11/26/18 0522 11/27/18 0529   LABALBU  3.0*  3.4  3.4     Troponin: No results for input(s): TROPONINI in the last 72 hours. BNP: No results for input(s): BNP in the last 72 hours. Lipids: No results for input(s): CHOL, HDL in the last 72 hours. Invalid input(s): LDLCALCU, TRIGLYCERIDE  ABGs:  No results for input(s): PHART, WML5HNN, PO2ART, WKB6SFL, BEART, THGBART, B7OLFBZK, DVR7WEQ in the last 72 hours. INR:   Recent Labs      11/25/18 0509 11/26/18 0522 11/27/18 0529   INR  1.21*  1.19*  1.14     Lactate: No results for input(s): LACTATE in the last 72 hours. Cultures:  -----------------------------------------------------------------  RAD:   CT HEAD WO CONTRAST   Final Result   1. Very large bilateral subdural hematomas with slight enlargement of the subdural hematoma along the posterior falx. 2. Slight enlargement of the ventricles and comparison to previous exam with stable ventriculostomy shunt catheter.       FL MODIFIED BARIUM SWALLOW W VIDEO   Final Result      No

## 2018-11-28 ENCOUNTER — APPOINTMENT (OUTPATIENT)
Dept: CT IMAGING | Age: 83
DRG: 025 | End: 2018-11-28
Attending: FAMILY MEDICINE
Payer: MEDICARE

## 2018-11-28 LAB
ANION GAP SERPL CALCULATED.3IONS-SCNC: 10 MMOL/L (ref 3–16)
BUN BLDV-MCNC: 31 MG/DL (ref 7–20)
CALCIUM SERPL-MCNC: 8.6 MG/DL (ref 8.3–10.6)
CHLORIDE BLD-SCNC: 112 MMOL/L (ref 99–110)
CO2: 20 MMOL/L (ref 21–32)
CREAT SERPL-MCNC: 0.6 MG/DL (ref 0.6–1.2)
GFR AFRICAN AMERICAN: >60
GFR NON-AFRICAN AMERICAN: >60
GLUCOSE BLD-MCNC: 109 MG/DL (ref 70–99)
HCT VFR BLD CALC: 41.8 % (ref 36–48)
HEMOGLOBIN: 13.2 G/DL (ref 12–16)
MCH RBC QN AUTO: 28.1 PG (ref 26–34)
MCHC RBC AUTO-ENTMCNC: 31.5 G/DL (ref 31–36)
MCV RBC AUTO: 89 FL (ref 80–100)
PDW BLD-RTO: 18.2 % (ref 12.4–15.4)
PLATELET # BLD: 205 K/UL (ref 135–450)
PMV BLD AUTO: 8.7 FL (ref 5–10.5)
POTASSIUM SERPL-SCNC: 4.5 MMOL/L (ref 3.5–5.1)
RBC # BLD: 4.7 M/UL (ref 4–5.2)
SODIUM BLD-SCNC: 142 MMOL/L (ref 136–145)
WBC # BLD: 14.8 K/UL (ref 4–11)

## 2018-11-28 PROCEDURE — 92507 TX SP LANG VOICE COMM INDIV: CPT

## 2018-11-28 PROCEDURE — 6370000000 HC RX 637 (ALT 250 FOR IP): Performed by: STUDENT IN AN ORGANIZED HEALTH CARE EDUCATION/TRAINING PROGRAM

## 2018-11-28 PROCEDURE — G8988 SELF CARE GOAL STATUS: HCPCS

## 2018-11-28 PROCEDURE — 6370000000 HC RX 637 (ALT 250 FOR IP): Performed by: INTERNAL MEDICINE

## 2018-11-28 PROCEDURE — 85027 COMPLETE CBC AUTOMATED: CPT

## 2018-11-28 PROCEDURE — 97168 OT RE-EVAL EST PLAN CARE: CPT

## 2018-11-28 PROCEDURE — 99233 SBSQ HOSP IP/OBS HIGH 50: CPT | Performed by: NURSE PRACTITIONER

## 2018-11-28 PROCEDURE — G8987 SELF CARE CURRENT STATUS: HCPCS

## 2018-11-28 PROCEDURE — 97535 SELF CARE MNGMENT TRAINING: CPT

## 2018-11-28 PROCEDURE — 36415 COLL VENOUS BLD VENIPUNCTURE: CPT

## 2018-11-28 PROCEDURE — 80048 BASIC METABOLIC PNL TOTAL CA: CPT

## 2018-11-28 PROCEDURE — 70450 CT HEAD/BRAIN W/O DYE: CPT

## 2018-11-28 PROCEDURE — 1200000000 HC SEMI PRIVATE

## 2018-11-28 PROCEDURE — 97164 PT RE-EVAL EST PLAN CARE: CPT

## 2018-11-28 PROCEDURE — 92526 ORAL FUNCTION THERAPY: CPT

## 2018-11-28 PROCEDURE — 6370000000 HC RX 637 (ALT 250 FOR IP): Performed by: NURSE PRACTITIONER

## 2018-11-28 PROCEDURE — 2580000003 HC RX 258: Performed by: STUDENT IN AN ORGANIZED HEALTH CARE EDUCATION/TRAINING PROGRAM

## 2018-11-28 PROCEDURE — 97116 GAIT TRAINING THERAPY: CPT

## 2018-11-28 RX ADMIN — LEVOTHYROXINE SODIUM 150 MCG: 150 TABLET ORAL at 06:06

## 2018-11-28 RX ADMIN — ACETAMINOPHEN 650 MG: 325 TABLET ORAL at 04:43

## 2018-11-28 RX ADMIN — METOPROLOL TARTRATE 25 MG: 25 TABLET ORAL at 09:19

## 2018-11-28 RX ADMIN — ACETAMINOPHEN 650 MG: 325 TABLET ORAL at 09:19

## 2018-11-28 RX ADMIN — Medication 10 ML: at 10:26

## 2018-11-28 RX ADMIN — METOPROLOL TARTRATE 25 MG: 25 TABLET ORAL at 20:11

## 2018-11-28 RX ADMIN — PANTOPRAZOLE SODIUM 40 MG: 40 TABLET, DELAYED RELEASE ORAL at 06:06

## 2018-11-28 RX ADMIN — POLYETHYLENE GLYCOL (3350) 17 G: 17 POWDER, FOR SOLUTION ORAL at 09:19

## 2018-11-28 RX ADMIN — ATORVASTATIN CALCIUM 20 MG: 20 TABLET, FILM COATED ORAL at 09:19

## 2018-11-28 ASSESSMENT — PAIN DESCRIPTION - ORIENTATION: ORIENTATION: OTHER (COMMENT)

## 2018-11-28 ASSESSMENT — PAIN DESCRIPTION - FREQUENCY
FREQUENCY: INTERMITTENT
FREQUENCY: INTERMITTENT

## 2018-11-28 ASSESSMENT — PAIN DESCRIPTION - DESCRIPTORS
DESCRIPTORS: ACHING
DESCRIPTORS: ACHING

## 2018-11-28 ASSESSMENT — PAIN SCALES - GENERAL
PAINLEVEL_OUTOF10: 5
PAINLEVEL_OUTOF10: 0
PAINLEVEL_OUTOF10: 3
PAINLEVEL_OUTOF10: 3
PAINLEVEL_OUTOF10: 5
PAINLEVEL_OUTOF10: 0

## 2018-11-28 ASSESSMENT — PAIN DESCRIPTION - PAIN TYPE
TYPE: SURGICAL PAIN
TYPE: SURGICAL PAIN

## 2018-11-28 ASSESSMENT — PAIN DESCRIPTION - PROGRESSION
CLINICAL_PROGRESSION: GRADUALLY WORSENING
CLINICAL_PROGRESSION: GRADUALLY IMPROVING
CLINICAL_PROGRESSION: GRADUALLY WORSENING

## 2018-11-28 ASSESSMENT — PAIN DESCRIPTION - LOCATION
LOCATION: HEAD
LOCATION: HEAD

## 2018-11-28 ASSESSMENT — PAIN DESCRIPTION - ONSET
ONSET: ON-GOING
ONSET: ON-GOING

## 2018-11-28 NOTE — PROGRESS NOTES
(cough noted immediately post swallow with thins and nectar via straw with pt). Will initiate modified diet with supervision; pt declines MBS (doesn't want to eat/drink anything for a while and pretty lethargic toward end of session). Will f/u with speech/cognitive evaluation as appropriate in later sessions (pt too lethargic at this time). MBS results - 11/23/18  Pt swallow essentially WFL's. Intermittently pt held bolus in oral cavity prior to swallow. Pt demonstrated effective mastication with solid, no oral residue. No penetration or aspiration occurred with any consistencies. There is very mild residue in valleculae and pyriforms. Recommended - dental soft with thin liquids     Pain: denies    Current Diet : Dental soft, thin liquids Cup; Straw - recommend upgrade to regular as pt back to baseline per daughter and is normally on regular diet    Treatment:  Pt seen bedside to address the following goals:  Short-term Goals  Goal 1: Pt will tolerate least restrictive diet without s/s of aspiration  11/22:  Pt able to scoop food and feed self better today. More alert and responsive overall. No oral residue, no holding of po (did not require any cues today vs yesterday) and functional mastication and management of cracker trials. Analyzed pt with nectar thick liquids as well with no cough/wet vocal quality/throat clearing with any po trials. Lungs are clear per physician notes today. Will continue present diet to assure consistency of abilities. Cont goal  11/25: Pt alert and pleasant, seated up in bed. Analyzed pt with thin liquids via cup and regular texture cracker. Pt with effective mastication; no cough/wet vocal quality/throat clearing with any po trials. Cont goal  11/26- goal met-  Lungs are clear per chart. Pt reported no difficulty with breakfast. Pt analyzed with trials with water by cup and straw and cracker. Pt demonstrated no s/s aspiration with any consistency presented.   Pt able to

## 2018-11-28 NOTE — PROGRESS NOTES
Speech Language Pathology  Facility/Department: [facility] 5T ORTHO/NEURO  Daily Treatment Note  NAME: Noel Bell  : 1933  MRN: [id number]    Date of Eval: 2018  Evaluating Therapist: Naresh Ward    Patient Diagnosis(es): has Subdural hematoma ([address] and Atrial fibrillation with slow ventricular response ([address] on her problem list.      RECENT RESULTS  CT OF HEAD/MRI:18  Bilateral large acute on chronic subdural hematomas.  Approximately 5 mm of   left-to-right shift.  Neurosurgical evaluation is recommended.      CT head 18  No significant interval change since prior exam    Chart reviewed. Pain: denied     Medical diagnosis:SDH  Treatment diagnosis:cognitive linguistic impairments    Treatment:  Pt seen to address the following goals:  Goal 1: Pt will demonstrate orientation x3 with mod verbal and environmental cues. -  The pt was oriented to self, place (hospital) and month independently. The pt required cues for year (wrote on the board- pt required min cues to utilize board later in the session). The pt was not oriented to president- was not able to recall through out the session, even with repetitions. con't goal  : pt oriented to person, place and month, did not recall year independently. Instructed to look to whiteboard for cues. Cont goal    Goal 2: Pt will follow 2 step commands with 80% accuracy, ,min cues  - goal met-  Pt achieved 100% accuracy with 2 step commands    Goal 3: Pt/family will verbalize and/or demonstrate understanding of word finding strategies and short term memory strategies with min-mod cues  -  Pt educated to [facility] strategies- hand out provided- reviewed several times through out the session, but pt not able to recall independently. Reviewed strategies for word finding difficulty- but did not spend as much time on this as pt had no instances of anomia during this session. con't goal  : no word finding noted during session.  Pt

## 2018-11-28 NOTE — PLAN OF CARE
Problem: Falls - Risk of:  Goal: Will remain free from falls  Will remain free from falls    Outcome: Ongoing  Fall risk precautions in place. Bed is locked and in lowest position. 2/4 side rails up. Call light within reach. Fall risk Bracelet in place. REANNA Camera in use for patient safety. Frequent checks on patient. Free from falls at this time. Will continue to monitor. Problem: Injury - Risk of, Physical Injury:  Goal: Will remain free from falls  Will remain free from falls    Outcome: Ongoing  Fall risk precautions in place. Bed is locked and in lowest position. 2/4 side rails up. Call light within reach. Fall risk Bracelet in place. REANNA Camera in use for patient safety. Frequent checks on patient. Free from falls at this time. Will continue to monitor. Problem: Activity:  Goal: Ability to tolerate increased activity will improve  Ability to tolerate increased activity will improve  Outcome: Ongoing  Patient ambulating to bathroom with walker, gait belt and assist x2, tolerating fair. Problem: Pain:  Goal: Pain level will decrease  Pain level will decrease  Outcome: Ongoing  Patient c/o pain 3/10 pain level. PRN medication given. Will continue to monitor.

## 2018-11-28 NOTE — PROGRESS NOTES
Physical Therapy    Facility/Department: [facility] 5T ORTHO/NEURO  Re- Assessment    NAME: Zackery Barroso  : 1933  MRN: [id number]    Date of Service: 2018    Discharge Recommendations:Rhea Calvillo scored a 15/24 on the AM-PAC short mobility form. Current research shows that an AM-PAC score of 17 or less is typically not associated with a discharge to the patient's home setting. Based on the patients AM-PAC score and their current functional mobility deficits, it is recommended that the patient have 3-5 sessions per week of Physical Therapy at d/c to increase the patients independence. PT Equipment Recommendations  Equipment Needed:  (defer)    Patient Diagnosis(es): There were no encounter diagnoses. has a past medical history of Atrial fibrillation ([address] Dyspnea; Hereditary and idiopathic neuropathy; Hydrocephalus; Hyperlipidemia; Hypertension; Localized edema; Muscle weakness (generalized); Syncope and collapse; Thyroid disease; Unspecified kidney failure; and UTI (urinary tract infection). has a past surgical history that includes Hysterectomy; Cholecystectomy; csf shunt; and pr gregory hole infratentor, explore (Bilateral, 2018). Restrictions  Position Activity Restriction  Other position/activity restrictions: activity as tolerated, elevate HOB 30 deg     Vision/Hearing  Vision: Impaired (R visual field cut)  Vision Exceptions: Wears glasses at all times  Hearing: Within functional limits       Subjective  General  Additional Pertinent Hx: Admit  from [facility] with SDH; head CT: (+) Bilateral large acute on chronic subdural hematomas with midline shift; neurosurg consult - OR  for B SDH evacuation;  PMHx: a-fib, dyspnea, neuropathy, HLD, HTN, generalized muscle weakness, kidney failure, UTI, CSF shunt  Family / Caregiver Present: Yes (daughter)  Diagnosis: SDH  Follows Commands: Within Functional Limits  Subjective  Subjective: Pt found reclined in chair

## 2018-11-29 VITALS
DIASTOLIC BLOOD PRESSURE: 70 MMHG | TEMPERATURE: 98.3 F | BODY MASS INDEX: 25.23 KG/M2 | HEART RATE: 81 BPM | HEIGHT: 66 IN | OXYGEN SATURATION: 95 % | SYSTOLIC BLOOD PRESSURE: 126 MMHG | WEIGHT: 157 LBS | RESPIRATION RATE: 16 BRPM

## 2018-11-29 PROCEDURE — 6370000000 HC RX 637 (ALT 250 FOR IP): Performed by: STUDENT IN AN ORGANIZED HEALTH CARE EDUCATION/TRAINING PROGRAM

## 2018-11-29 PROCEDURE — 99233 SBSQ HOSP IP/OBS HIGH 50: CPT | Performed by: INTERNAL MEDICINE

## 2018-11-29 PROCEDURE — 2580000003 HC RX 258: Performed by: STUDENT IN AN ORGANIZED HEALTH CARE EDUCATION/TRAINING PROGRAM

## 2018-11-29 PROCEDURE — 6370000000 HC RX 637 (ALT 250 FOR IP): Performed by: INTERNAL MEDICINE

## 2018-11-29 PROCEDURE — 97535 SELF CARE MNGMENT TRAINING: CPT

## 2018-11-29 PROCEDURE — 97530 THERAPEUTIC ACTIVITIES: CPT

## 2018-11-29 PROCEDURE — 92526 ORAL FUNCTION THERAPY: CPT

## 2018-11-29 PROCEDURE — 97110 THERAPEUTIC EXERCISES: CPT

## 2018-11-29 PROCEDURE — 97116 GAIT TRAINING THERAPY: CPT

## 2018-11-29 PROCEDURE — 6370000000 HC RX 637 (ALT 250 FOR IP): Performed by: NURSE PRACTITIONER

## 2018-11-29 PROCEDURE — 99233 SBSQ HOSP IP/OBS HIGH 50: CPT | Performed by: NURSE PRACTITIONER

## 2018-11-29 PROCEDURE — 97127 HC SP THER IVNTJ W/FOCUS COG FUNCJ: CPT

## 2018-11-29 RX ORDER — POLYETHYLENE GLYCOL 3350 17 G/17G
17 POWDER, FOR SOLUTION ORAL DAILY
Qty: 527 G | Refills: 1 | Status: SHIPPED | OUTPATIENT
Start: 2018-11-30 | End: 2018-12-30

## 2018-11-29 RX ORDER — HYDROCODONE BITARTRATE AND ACETAMINOPHEN 5; 325 MG/1; MG/1
1 TABLET ORAL EVERY 8 HOURS PRN
Qty: 12 TABLET | Refills: 0 | Status: SHIPPED | OUTPATIENT
Start: 2018-11-29 | End: 2018-12-02

## 2018-11-29 RX ORDER — BISACODYL 10 MG
10 SUPPOSITORY, RECTAL RECTAL DAILY PRN
Qty: 30 SUPPOSITORY | Refills: 0 | Status: SHIPPED | OUTPATIENT
Start: 2018-11-29 | End: 2018-12-29

## 2018-11-29 RX ORDER — ATORVASTATIN CALCIUM 20 MG/1
20 TABLET, FILM COATED ORAL DAILY
Qty: 30 TABLET | Refills: 1 | Status: SHIPPED | OUTPATIENT
Start: 2018-11-30 | End: 2019-07-29 | Stop reason: CLARIF

## 2018-11-29 RX ADMIN — Medication 10 ML: at 10:17

## 2018-11-29 RX ADMIN — ATORVASTATIN CALCIUM 20 MG: 20 TABLET, FILM COATED ORAL at 09:59

## 2018-11-29 RX ADMIN — LEVOTHYROXINE SODIUM 150 MCG: 150 TABLET ORAL at 10:03

## 2018-11-29 RX ADMIN — PANTOPRAZOLE SODIUM 40 MG: 40 TABLET, DELAYED RELEASE ORAL at 10:03

## 2018-11-29 RX ADMIN — METOPROLOL TARTRATE 25 MG: 25 TABLET ORAL at 09:59

## 2018-11-29 RX ADMIN — POLYETHYLENE GLYCOL (3350) 17 G: 17 POWDER, FOR SOLUTION ORAL at 09:59

## 2018-11-29 ASSESSMENT — PAIN SCALES - GENERAL
PAINLEVEL_OUTOF10: 0
PAINLEVEL_OUTOF10: 0

## 2018-11-29 NOTE — PROGRESS NOTES
evaluation as appropriate in later sessions (pt too lethargic at this time). MBS results - 11/23/18  Pt swallow essentially WFL's. Intermittently pt held bolus in oral cavity prior to swallow. Pt demonstrated effective mastication with solid, no oral residue. No penetration or aspiration occurred with any consistencies. There is very mild residue in valleculae and pyriforms. Recommended - regular  with thin liquids     Pain: denies    Current Diet : regular diet    Treatment:  Pt seen bedside to address the following goals:  Short-term Goals  Goal 1: Pt will tolerate least restrictive diet without s/s of aspiration  11/22:  Pt able to scoop food and feed self better today. More alert and responsive overall. No oral residue, no holding of po (did not require any cues today vs yesterday) and functional mastication and management of cracker trials. Analyzed pt with nectar thick liquids as well with no cough/wet vocal quality/throat clearing with any po trials. Lungs are clear per physician notes today. Will continue present diet to assure consistency of abilities. Cont goal  11/25: Pt alert and pleasant, seated up in bed. Analyzed pt with thin liquids via cup and regular texture cracker. Pt with effective mastication; no cough/wet vocal quality/throat clearing with any po trials. Cont goal  11/26- goal met-  Lungs are clear per chart. Pt reported no difficulty with breakfast. Pt analyzed with trials with water by cup and straw and cracker. Pt demonstrated no s/s aspiration with any consistency presented. Pt able to initiate swallow in a timely manner with good laryngeal elevation. Vocal quality remained clear throughout. No coughing, throat clearing or choking observed with any consistency. 11/28: pt s/p surgery 11/27. Pt sitting up in chair eating lunch, feeding self. Daughter bedside and states pt is back to baseline cognitively.   Pt consuming meatloaf, green beans and liquids via straw with no

## 2018-11-29 NOTE — DISCHARGE INSTR - COC
Nutrition Therapy:   - Oral Diet:  General    Routes of Feeding: Oral  Liquids: Thin Liquids  Daily Fluid Restriction: no  Last Modified Barium Swallow with Video (Video Swallowing Test): not done    Treatments at the Time of Hospital Discharge:   Respiratory Treatments:   Oxygen Therapy:  is not on home oxygen therapy. Ventilator:    - No ventilator support    Rehab Therapies: Physical Therapy /OT  Weight Bearing Status/Restrictions: No weight bearing restirctions  Other Medical Equipment (for information only, NOT a DME order):  walker  Other Treatments:     Patient's personal belongings (please select all that are sent with patient):  Glasses and hard of hearing    RN SIGNATURE:  Electronically signed by Karol Castillo RN on 11/29/18 at 1:32 PM    CASE MANAGEMENT/SOCIAL WORK SECTION    Inpatient Status Date: 11/19/2018    Readmission Risk Assessment Score:  Readmission Risk              Risk of Unplanned Readmission:        14           Discharging to Facility/ Agency   · Name: [facility]  · Address:  · Phone:[phone]  · HLR:[phone]    ·     / signature: Electronically signed by Kiara Fink RN on 11/29/18 at 12:50 PM    PHYSICIAN SECTION    Prognosis: Fair    Condition at Discharge: Stable    Rehab Potential (if transferring to Rehab): Fair    Recommended Labs or Other Treatments After Discharge:   Resume asa in 2 weeks  Follow up with neurosrugery  Pt/ot  bp monitoring    Physician Certification: I certify the above information and transfer of Noel Bell  is necessary for the continuing treatment of the diagnosis listed and that she requires [facility] for greater 30 days.      Update Admission H&P: Changes in H&P as follows - hold antiplatelet agents for 2 weeks    PHYSICIAN SIGNATURE:  Electronically signed by Cristobal Martin MD on 11/29/18 at 12:28 PM

## 2018-11-29 NOTE — PROGRESS NOTES
normal without wall motion abnormalities.    3. Post stress EF is greater than 70.  4. No prior study is available for comparison    EKG 11/19/18 Atrial fibrillation LAD poor R wave progression ?septal infarct nonspecific T wave abnormality  Trop<0.01      HTN   Optional     Hypothyroid  Do TSH       Keep magnesium >2  potassium >3.5   sodium >135      HLD  Lipitor     SAH / hx NPH (history of normal pressure hydrocephalus and has a  shunt)  11/27/18 surgery   1.  Trephine parietal craniotomy for evacuation of right subdural hematoma  2.  Trephine frontal craniotomy for evacuation of left subdural hematoma   DRAINS:   1. Bilateral subdural KIRAN drains attached to bile collection bags for passive drainage   IMPLANTS:   1. Synthes cranial plating system    11/28/18 CT head  contrast    Interval placement of bilateral subdural drainage catheters with decrease in size/volume of both subdural hematomas. Interval increase in size of lateral ventricles, presumably due to partial evacuation of the subdural collections   Improvement with regards to left to right shift of midline structures   Progression of acute subarachnoid hemorrhage in the left temporal and parietal lobes   Decrease in size of subdural hemorrhage along the left margin of the posterior falx      Plan   HR 60s afib  / back on metoprolol 25mg BID / no blocks noted          Hx fall with NPH / POD#2           11/281/ Per Dr. Jonatan Abdullahi hold all anticoagulation & antiplatelet for 2 weeks          Subdural drains removed with sterile technique this afternoon         Will follow / when discharged fu with Dr. Florez Poster in 2-3 weeks       IBRAHIMA Aiken CVNP  11/29/2018   Stable today from a cardiac perspective. Still atrial fibrillation the rate sound good at 60/m. On metoprolol 25 twice a day and no significant AV block. At this time I think all this stable from our perspective.   Neurology recommends holding all anticoagulants and antiplatelet drugs for

## 2018-11-29 NOTE — PROGRESS NOTES
Physical Therapy  Daily Treatment Note    Discharge Recommendations: Claudia Costello scored a 16/24 on the AM-PAC short mobility form. Current research shows that an AM-PAC score of 17 or less is typically not associated with a discharge to the patient's home setting. Based on the patients AM-PAC score and their current functional mobility deficits, it is recommended that the patient have 3-5 sessions per week of Physical Therapy at d/c to increase the patients independence. Equipment Needs: Defer to next level of care    Chart Reviewed: Yes     Other position/activity restrictions: activity as tolerated, elevate HOB 30 deg   Additional Pertinent Hx: Admit 11/19 from [facility] with SDH; head CT: (+) Bilateral large acute on chronic subdural hematomas with midline shift; neurosurg consult - OR 11/27 for B SDH evacuation; PMHx: a-fib, dyspnea, neuropathy, HLD, HTN, generalized muscle weakness, kidney failure, UTI, CSF shunt      Diagnosis: SDH   Treatment Diagnosis: impaired gait and transfers due to B SDH    Subjective: Pt in bed chair initially. Ready to work with PT. Finishing lunch. Pain: Denies    Objective:    Transfers  Sit to stand: Min assist x 1 from chair (1st trial); Mod assist x 1 from chair (2nd trial); Min assist x 1 from 3:1 commode (over toilet)  Stand to sit: Min assist x 1 into chair/3:1 commode (x 3 trials)  Other: Cues for hand placement with transfers    Ambulation  Assistance Level: Min assist  Assistive device: Wheeled walker  Distance: 50 ft x 2. Seated rest between walks. Quality of gait: Weak; decreased pace; decreased step length/height; cues for scanning and avoiding obstacles on the right. Other: Needing cues and assist once for keeping R hand  on walker.      Exercises  12 reps B LAQ, hip abd/add, marching, heel/toe raises in sitting    Balance  Static stance with walker CGA  Ambulation with wheeled walker CGA to Min assist  Sitting on commode with SBA    Patient

## 2018-12-11 LAB
EKG ATRIAL RATE: 312 BPM
EKG DIAGNOSIS: NORMAL
EKG Q-T INTERVAL: 400 MS
EKG QRS DURATION: 74 MS
EKG QTC CALCULATION (BAZETT): 456 MS
EKG R AXIS: -67 DEGREES
EKG T AXIS: 59 DEGREES
EKG VENTRICULAR RATE: 78 BPM

## 2018-12-18 ENCOUNTER — HOSPITAL ENCOUNTER (INPATIENT)
Age: 83
LOS: 2 days | Discharge: NURSING, REHAB OR LONG-TERM CARE | DRG: 064 | End: 2018-12-20
Attending: INTERNAL MEDICINE | Admitting: INTERNAL MEDICINE
Payer: MEDICARE

## 2018-12-18 ENCOUNTER — HOSPITAL ENCOUNTER (EMERGENCY)
Age: 83
Discharge: ANOTHER HOSPITAL | End: 2018-12-18
Attending: EMERGENCY MEDICINE
Payer: MEDICARE

## 2018-12-18 ENCOUNTER — APPOINTMENT (OUTPATIENT)
Dept: CT IMAGING | Age: 83
End: 2018-12-18
Payer: MEDICARE

## 2018-12-18 ENCOUNTER — APPOINTMENT (OUTPATIENT)
Dept: GENERAL RADIOLOGY | Age: 83
End: 2018-12-18
Payer: MEDICARE

## 2018-12-18 VITALS
SYSTOLIC BLOOD PRESSURE: 128 MMHG | HEART RATE: 75 BPM | OXYGEN SATURATION: 97 % | WEIGHT: 157 LBS | DIASTOLIC BLOOD PRESSURE: 78 MMHG | BODY MASS INDEX: 25.34 KG/M2 | RESPIRATION RATE: 15 BRPM | TEMPERATURE: 97.6 F

## 2018-12-18 DIAGNOSIS — R41.0 DELIRIUM: Primary | ICD-10-CM

## 2018-12-18 DIAGNOSIS — S06.5X0D SUBDURAL HEMATOMA WITHOUT COMA, WITHOUT LOSS OF CONSCIOUSNESS, SUBSEQUENT ENCOUNTER: ICD-10-CM

## 2018-12-18 DIAGNOSIS — N30.00 ACUTE CYSTITIS WITHOUT HEMATURIA: ICD-10-CM

## 2018-12-18 PROBLEM — I62.03 CHRONIC SUBDURAL HEMATOMA (HCC): Status: ACTIVE | Noted: 2018-12-18

## 2018-12-18 LAB
A/G RATIO: 0.9 (ref 1.1–2.2)
ALBUMIN SERPL-MCNC: 3.2 G/DL (ref 3.4–5)
ALP BLD-CCNC: 175 U/L (ref 40–129)
ALT SERPL-CCNC: 20 U/L (ref 10–40)
AMMONIA: 44 UMOL/L (ref 11–51)
ANION GAP SERPL CALCULATED.3IONS-SCNC: 11 MMOL/L (ref 3–16)
AST SERPL-CCNC: 25 U/L (ref 15–37)
BACTERIA: ABNORMAL /HPF
BASOPHILS ABSOLUTE: 0.1 K/UL (ref 0–0.2)
BASOPHILS RELATIVE PERCENT: 1.2 %
BILIRUB SERPL-MCNC: 0.8 MG/DL (ref 0–1)
BILIRUBIN URINE: NEGATIVE
BLOOD, URINE: ABNORMAL
BUN BLDV-MCNC: 17 MG/DL (ref 7–20)
CALCIUM SERPL-MCNC: 9.4 MG/DL (ref 8.3–10.6)
CHLORIDE BLD-SCNC: 103 MMOL/L (ref 99–110)
CLARITY: ABNORMAL
CO2: 25 MMOL/L (ref 21–32)
COLOR: YELLOW
CREAT SERPL-MCNC: 0.6 MG/DL (ref 0.6–1.2)
EKG ATRIAL RATE: 78 BPM
EKG DIAGNOSIS: NORMAL
EKG Q-T INTERVAL: 390 MS
EKG QRS DURATION: 74 MS
EKG QTC CALCULATION (BAZETT): 453 MS
EKG R AXIS: -68 DEGREES
EKG T AXIS: 81 DEGREES
EKG VENTRICULAR RATE: 81 BPM
EOSINOPHILS ABSOLUTE: 0.1 K/UL (ref 0–0.6)
EOSINOPHILS RELATIVE PERCENT: 2.4 %
EPITHELIAL CELLS, UA: 12 /HPF (ref 0–5)
GFR AFRICAN AMERICAN: >60
GFR NON-AFRICAN AMERICAN: >60
GLOBULIN: 3.5 G/DL
GLUCOSE BLD-MCNC: 117 MG/DL (ref 70–99)
GLUCOSE URINE: NEGATIVE MG/DL
HCT VFR BLD CALC: 44.8 % (ref 36–48)
HEMOGLOBIN: 14.5 G/DL (ref 12–16)
HYALINE CASTS: 16 /LPF (ref 0–8)
KETONES, URINE: NEGATIVE MG/DL
LEUKOCYTE ESTERASE, URINE: ABNORMAL
LIPASE: 42 U/L (ref 13–60)
LYMPHOCYTES ABSOLUTE: 1.4 K/UL (ref 1–5.1)
LYMPHOCYTES RELATIVE PERCENT: 22.6 %
MCH RBC QN AUTO: 28.1 PG (ref 26–34)
MCHC RBC AUTO-ENTMCNC: 32.4 G/DL (ref 31–36)
MCV RBC AUTO: 86.6 FL (ref 80–100)
MICROSCOPIC EXAMINATION: YES
MONOCYTES ABSOLUTE: 0.7 K/UL (ref 0–1.3)
MONOCYTES RELATIVE PERCENT: 11.6 %
NEUTROPHILS ABSOLUTE: 3.9 K/UL (ref 1.7–7.7)
NEUTROPHILS RELATIVE PERCENT: 62.2 %
NITRITE, URINE: POSITIVE
PDW BLD-RTO: 18 % (ref 12.4–15.4)
PH UA: 7
PLATELET # BLD: 352 K/UL (ref 135–450)
PMV BLD AUTO: 8 FL (ref 5–10.5)
POTASSIUM REFLEX MAGNESIUM: 4 MMOL/L (ref 3.5–5.1)
PRO-BNP: 522 PG/ML (ref 0–449)
PROTEIN UA: NEGATIVE MG/DL
RBC # BLD: 5.18 M/UL (ref 4–5.2)
RBC UA: 2 /HPF (ref 0–4)
SODIUM BLD-SCNC: 139 MMOL/L (ref 136–145)
SPECIFIC GRAVITY UA: 1.02
TOTAL PROTEIN: 6.7 G/DL (ref 6.4–8.2)
TROPONIN: <0.01 NG/ML
URINE TYPE: ABNORMAL
UROBILINOGEN, URINE: 1 E.U./DL
WBC # BLD: 6.3 K/UL (ref 4–11)
WBC UA: 18 /HPF (ref 0–5)

## 2018-12-18 PROCEDURE — 80053 COMPREHEN METABOLIC PANEL: CPT

## 2018-12-18 PROCEDURE — 82140 ASSAY OF AMMONIA: CPT

## 2018-12-18 PROCEDURE — 83880 ASSAY OF NATRIURETIC PEPTIDE: CPT

## 2018-12-18 PROCEDURE — 2500000003 HC RX 250 WO HCPCS: Performed by: EMERGENCY MEDICINE

## 2018-12-18 PROCEDURE — 96374 THER/PROPH/DIAG INJ IV PUSH: CPT

## 2018-12-18 PROCEDURE — 71045 X-RAY EXAM CHEST 1 VIEW: CPT

## 2018-12-18 PROCEDURE — 99285 EMERGENCY DEPT VISIT HI MDM: CPT

## 2018-12-18 PROCEDURE — 70450 CT HEAD/BRAIN W/O DYE: CPT

## 2018-12-18 PROCEDURE — 81001 URINALYSIS AUTO W/SCOPE: CPT

## 2018-12-18 PROCEDURE — 83690 ASSAY OF LIPASE: CPT

## 2018-12-18 PROCEDURE — 93010 ELECTROCARDIOGRAM REPORT: CPT | Performed by: INTERNAL MEDICINE

## 2018-12-18 PROCEDURE — 93005 ELECTROCARDIOGRAM TRACING: CPT | Performed by: EMERGENCY MEDICINE

## 2018-12-18 PROCEDURE — 6360000002 HC RX W HCPCS: Performed by: EMERGENCY MEDICINE

## 2018-12-18 PROCEDURE — 84484 ASSAY OF TROPONIN QUANT: CPT

## 2018-12-18 PROCEDURE — 1200000000 HC SEMI PRIVATE

## 2018-12-18 PROCEDURE — 85025 COMPLETE CBC W/AUTO DIFF WBC: CPT

## 2018-12-18 RX ORDER — SODIUM CHLORIDE 0.9 % (FLUSH) 0.9 %
10 SYRINGE (ML) INJECTION PRN
Status: DISCONTINUED | OUTPATIENT
Start: 2018-12-18 | End: 2018-12-20 | Stop reason: HOSPADM

## 2018-12-18 RX ORDER — SODIUM CHLORIDE 0.9 % (FLUSH) 0.9 %
10 SYRINGE (ML) INJECTION EVERY 12 HOURS SCHEDULED
Status: DISCONTINUED | OUTPATIENT
Start: 2018-12-19 | End: 2018-12-20 | Stop reason: HOSPADM

## 2018-12-18 RX ORDER — ONDANSETRON 2 MG/ML
4 INJECTION INTRAMUSCULAR; INTRAVENOUS EVERY 6 HOURS PRN
Status: DISCONTINUED | OUTPATIENT
Start: 2018-12-18 | End: 2018-12-20 | Stop reason: HOSPADM

## 2018-12-18 RX ADMIN — CEFTRIAXONE SODIUM 1 G: 10 INJECTION, POWDER, FOR SOLUTION INTRAVENOUS at 16:00

## 2018-12-18 NOTE — PROGRESS NOTES
Report given by Abril. RN reports unwitnessed fall yesterday. Daughter notified of pt being transported, all morning meds given, along with tylenol for headache.

## 2018-12-18 NOTE — ED PROVIDER NOTES
Emergency [address]  EMERGENCY DEPARTMENT    Patient: Livier Reyna  MRN: [id number]  : 1933  Date of Evaluation: 2018  ED Provider: Ahmet Basurto MD    Chief Complaint       Chief Complaint   Patient presents with    Altered Mental Status     Pt to Er via ff squuad from [facility] with AMs, states pt was found on ground after unwitnessed fall, states unsure how long she was there. states patient acting unusual today, unsure when started. Pt usually A/Ox4, alert to self and location. [address] is a 80 y.o. female who presents to the emergency department   This is a 80-year-old female brought to emergency department for evaluation of change in mental status. According to the patient's daughter patient had been usual state of health until 5 or 6 days ago when symptoms started. She knows the patient was acting more confused than usual and having worsening coordination issues. Patient does have a history of a subdural hematoma been seen and evaluated an outside hospital.  She is not currently on any blood thinners. No reported head injury in the past 2 weeks. No reported fevers or change in bowel or bladder habits. Patient herself is not complaining of anything. Family reports that at baseline patient is able to walk with a walker and feed herself    ROS:     At least 10 systems reviewed and otherwise acutely negative except as in the [address]     Past History     Past Medical History:   Diagnosis Date    Atrial fibrillation (HCC)     Dyspnea     Hereditary and idiopathic neuropathy     Hydrocephalus     Hyperlipidemia     Hypertension     Localized edema     Muscle weakness (generalized)     Syncope and collapse     Thyroid disease     Unspecified kidney failure     UTI (urinary tract infection)      Past Surgical History:   Procedure Laterality Date    CHOLECYSTECTOMY      CSF SHUNT      HYSTERECTOMY      RI ALEXIA HOLE INFRATENTOR, EXPLORE Bilateral 11/27/2018    BILATERAL TREPHINE CRANIOTOMIES FOR SUBDURAL EVACUATION performed by Baljit Fierro MD at [address] [place] [address] Marital status:      Spouse name: N/A    Number of children: N/A    Years of education: N/A     Social History Main Topics    Smoking status: Never Smoker    Smokeless tobacco: Never Used    Alcohol use No    Drug use: No    Sexual activity: Not Asked     Other Topics Concern    None     Social History Narrative    None       Medications/Allergies     Previous Medications    ATORVASTATIN (LIPITOR) 20 MG TABLET    Take 1 tablet by mouth daily    BISACODYL (DULCOLAX) 10 MG SUPPOSITORY    Place 1 suppository rectally daily as needed for Constipation    CALCIUM CARBONATE 600 MG TABS TABLET    Take 2.5 tablets by mouth daily    HYDROCHLOROTHIAZIDE (HYDRODIURIL) 25 MG TABLET    Take 25 mg by mouth daily. LEVOTHYROXINE (SYNTHROID) 150 MCG TABLET    Take 150 mcg by mouth Daily    METOPROLOL TARTRATE (LOPRESSOR) 25 MG TABLET    Take 1 tablet by mouth 2 times daily    MIRABEGRON ER 25 MG TB24    Take 25 mg by mouth daily    MULTIPLE VITAMINS-MINERALS (THERAPEUTIC MULTIVITAMIN-MINERALS) TABLET    Take 1 tablet by mouth daily    NYSTATIN (MYCOSTATIN) 137870 UNIT/GM POWDER    Apply topically 4 times daily Apply topically 4 times daily.     POLYETHYLENE GLYCOL (GLYCOLAX) PACKET    Take 17 g by mouth daily     No Known Allergies     Physical Exam       ED Triage Vitals [12/18/18 1234]   BP Temp Temp Source Pulse Resp SpO2 Height Weight   (!) 141/92 97.6 °F (36.4 °C) Oral 80 17 97 % -- 157 lb (71.2 kg)     General: Well-appearing well-nourished female in no acute distress  HEENT:  PERRLA, oropharynx moist mucous membranes no erythema edema or tonsillar exudate no trismus uvula is midline neck is supple   Chest: Regular rate and rhythm no murmurs rubs or gallops  Lungs: clear to auscultation bilaterally no wheezes rales rhonchi or stridor no accessory muscle usage no pursed lip breathing   Abdomen: Soft nontender nondistended no rebound guarding or peritoneal signs no tenderness at McBurney's point negative Kelley sign  Back: No cervical thoracic or lumbosacral bony tenderness no CVA tenderness no posterior rib tenderness or crepitus noted  Extremities: No obvious deformities or dislocations.   No tenderness behind either one of her calves  Psychiatric: Alert and oriented ×3  Neurologic: Cranial nerves II through XII are grossly intact no apparent motor or sensory deficits  Integuments: No rashes erythema contusions or petechiae     Diagnostics   Labs:  Results for orders placed or performed during the hospital encounter of 12/18/18   CBC Auto Differential   Result Value Ref Range    WBC 6.3 4.0 - 11.0 K/uL    RBC 5.18 4.00 - 5.20 M/uL    Hemoglobin 14.5 12.0 - 16.0 g/dL    Hematocrit 44.8 36.0 - 48.0 %    MCV 86.6 80.0 - 100.0 fL    MCH 28.1 26.0 - 34.0 pg    MCHC 32.4 31.0 - 36.0 g/dL    RDW 18.0 (H) 12.4 - 15.4 %    Platelets 083 332 - 766 K/uL    MPV 8.0 5.0 - 10.5 fL    Neutrophils % 62.2 %    Lymphocytes % 22.6 %    Monocytes % 11.6 %    Eosinophils % 2.4 %    Basophils % 1.2 %    Neutrophils # 3.9 1.7 - 7.7 K/uL    Lymphocytes # 1.4 1.0 - 5.1 K/uL    Monocytes # 0.7 0.0 - 1.3 K/uL    Eosinophils # 0.1 0.0 - 0.6 K/uL    Basophils # 0.1 0.0 - 0.2 K/uL   Comprehensive Metabolic Panel w/ Reflex to MG   Result Value Ref Range    Sodium 139 136 - 145 mmol/L    Potassium reflex Magnesium 4.0 3.5 - 5.1 mmol/L    Chloride 103 99 - 110 mmol/L    CO2 25 21 - 32 mmol/L    Anion Gap 11 3 - 16    Glucose 117 (H) 70 - 99 mg/dL    BUN 17 7 - 20 mg/dL    CREATININE 0.6 0.6 - 1.2 mg/dL    GFR Non-African American >60 >60    GFR African American >60 >60    Calcium 9.4 8.3 - 10.6 mg/dL    Total Protein 6.7 6.4 - 8.2 g/dL    Alb 3.2 (L) 3.4 - 5.0 g/dL    Albumin/Globulin Ratio 0.9 (L) 1.1 - 2.2    Total Bilirubin 0.8 0.0 - 1.0 mg/dL    Alkaline

## 2018-12-19 ENCOUNTER — APPOINTMENT (OUTPATIENT)
Dept: CT IMAGING | Age: 83
DRG: 064 | End: 2018-12-19
Attending: INTERNAL MEDICINE
Payer: MEDICARE

## 2018-12-19 LAB
ANION GAP SERPL CALCULATED.3IONS-SCNC: 13 MMOL/L (ref 3–16)
BASOPHILS ABSOLUTE: 0.1 K/UL (ref 0–0.2)
BASOPHILS RELATIVE PERCENT: 1 %
BUN BLDV-MCNC: 17 MG/DL (ref 7–20)
CALCIUM SERPL-MCNC: 9 MG/DL (ref 8.3–10.6)
CHLORIDE BLD-SCNC: 103 MMOL/L (ref 99–110)
CO2: 25 MMOL/L (ref 21–32)
CREAT SERPL-MCNC: 0.6 MG/DL (ref 0.6–1.2)
EOSINOPHILS ABSOLUTE: 0.2 K/UL (ref 0–0.6)
EOSINOPHILS RELATIVE PERCENT: 3.1 %
GFR AFRICAN AMERICAN: >60
GFR NON-AFRICAN AMERICAN: >60
GLUCOSE BLD-MCNC: 86 MG/DL (ref 70–99)
HCT VFR BLD CALC: 41.6 % (ref 36–48)
HEMOGLOBIN: 13.4 G/DL (ref 12–16)
INR BLD: 1.26 (ref 0.86–1.14)
LYMPHOCYTES ABSOLUTE: 1.4 K/UL (ref 1–5.1)
LYMPHOCYTES RELATIVE PERCENT: 20.3 %
MCH RBC QN AUTO: 27.8 PG (ref 26–34)
MCHC RBC AUTO-ENTMCNC: 32.1 G/DL (ref 31–36)
MCV RBC AUTO: 86.6 FL (ref 80–100)
MONOCYTES ABSOLUTE: 0.8 K/UL (ref 0–1.3)
MONOCYTES RELATIVE PERCENT: 11.7 %
NEUTROPHILS ABSOLUTE: 4.4 K/UL (ref 1.7–7.7)
NEUTROPHILS RELATIVE PERCENT: 63.9 %
PDW BLD-RTO: 17.7 % (ref 12.4–15.4)
PLATELET # BLD: 335 K/UL (ref 135–450)
PMV BLD AUTO: 8 FL (ref 5–10.5)
POTASSIUM REFLEX MAGNESIUM: 3.7 MMOL/L (ref 3.5–5.1)
PROTHROMBIN TIME: 14.4 SEC (ref 9.8–13)
RBC # BLD: 4.81 M/UL (ref 4–5.2)
SODIUM BLD-SCNC: 141 MMOL/L (ref 136–145)
WBC # BLD: 7 K/UL (ref 4–11)

## 2018-12-19 PROCEDURE — 6360000002 HC RX W HCPCS: Performed by: NURSE PRACTITIONER

## 2018-12-19 PROCEDURE — 99221 1ST HOSP IP/OBS SF/LOW 40: CPT | Performed by: NURSE PRACTITIONER

## 2018-12-19 PROCEDURE — 96365 THER/PROPH/DIAG IV INF INIT: CPT

## 2018-12-19 PROCEDURE — 1200000000 HC SEMI PRIVATE

## 2018-12-19 PROCEDURE — G8996 SWALLOW CURRENT STATUS: HCPCS

## 2018-12-19 PROCEDURE — 36415 COLL VENOUS BLD VENIPUNCTURE: CPT

## 2018-12-19 PROCEDURE — G8997 SWALLOW GOAL STATUS: HCPCS

## 2018-12-19 PROCEDURE — 85025 COMPLETE CBC W/AUTO DIFF WBC: CPT

## 2018-12-19 PROCEDURE — 70450 CT HEAD/BRAIN W/O DYE: CPT

## 2018-12-19 PROCEDURE — 87086 URINE CULTURE/COLONY COUNT: CPT

## 2018-12-19 PROCEDURE — 6360000002 HC RX W HCPCS: Performed by: INTERNAL MEDICINE

## 2018-12-19 PROCEDURE — 6370000000 HC RX 637 (ALT 250 FOR IP): Performed by: STUDENT IN AN ORGANIZED HEALTH CARE EDUCATION/TRAINING PROGRAM

## 2018-12-19 PROCEDURE — 2580000003 HC RX 258: Performed by: INTERNAL MEDICINE

## 2018-12-19 PROCEDURE — 92526 ORAL FUNCTION THERAPY: CPT

## 2018-12-19 PROCEDURE — 2580000003 HC RX 258: Performed by: STUDENT IN AN ORGANIZED HEALTH CARE EDUCATION/TRAINING PROGRAM

## 2018-12-19 PROCEDURE — 92610 EVALUATE SWALLOWING FUNCTION: CPT

## 2018-12-19 PROCEDURE — 85610 PROTHROMBIN TIME: CPT

## 2018-12-19 PROCEDURE — 80048 BASIC METABOLIC PNL TOTAL CA: CPT

## 2018-12-19 RX ORDER — ATORVASTATIN CALCIUM 20 MG/1
20 TABLET, FILM COATED ORAL DAILY
Status: DISCONTINUED | OUTPATIENT
Start: 2018-12-19 | End: 2018-12-20 | Stop reason: HOSPADM

## 2018-12-19 RX ORDER — DEXTROSE AND SODIUM CHLORIDE 5; .45 G/100ML; G/100ML
INJECTION, SOLUTION INTRAVENOUS CONTINUOUS
Status: DISCONTINUED | OUTPATIENT
Start: 2018-12-19 | End: 2018-12-20 | Stop reason: HOSPADM

## 2018-12-19 RX ORDER — LEVOTHYROXINE SODIUM 0.15 MG/1
150 TABLET ORAL DAILY
Status: DISCONTINUED | OUTPATIENT
Start: 2018-12-19 | End: 2018-12-20 | Stop reason: HOSPADM

## 2018-12-19 RX ORDER — HYDROCHLOROTHIAZIDE 25 MG/1
25 TABLET ORAL DAILY
Status: DISCONTINUED | OUTPATIENT
Start: 2018-12-19 | End: 2018-12-20

## 2018-12-19 RX ORDER — DEXAMETHASONE 0.5 MG/1
2 TABLET ORAL 2 TIMES DAILY
Status: DISCONTINUED | OUTPATIENT
Start: 2018-12-19 | End: 2018-12-20 | Stop reason: HOSPADM

## 2018-12-19 RX ORDER — POLYETHYLENE GLYCOL 3350 17 G/17G
17 POWDER, FOR SOLUTION ORAL DAILY
Status: DISCONTINUED | OUTPATIENT
Start: 2018-12-19 | End: 2018-12-20 | Stop reason: HOSPADM

## 2018-12-19 RX ORDER — CALCIUM CARBONATE 500(1250)
500 TABLET ORAL DAILY
Status: DISCONTINUED | OUTPATIENT
Start: 2018-12-19 | End: 2018-12-20 | Stop reason: HOSPADM

## 2018-12-19 RX ORDER — ACETAMINOPHEN 325 MG/1
650 TABLET ORAL ONCE
Status: DISCONTINUED | OUTPATIENT
Start: 2018-12-20 | End: 2018-12-20 | Stop reason: HOSPADM

## 2018-12-19 RX ADMIN — CEFTRIAXONE 1 G: 1 INJECTION, POWDER, FOR SOLUTION INTRAMUSCULAR; INTRAVENOUS at 16:03

## 2018-12-19 RX ADMIN — Medication 10 ML: at 07:53

## 2018-12-19 RX ADMIN — DEXTROSE AND SODIUM CHLORIDE: 5; 450 INJECTION, SOLUTION INTRAVENOUS at 10:59

## 2018-12-19 RX ADMIN — Medication 10 ML: at 21:43

## 2018-12-19 RX ADMIN — DEXAMETHASONE 2 MG: 0.5 TABLET ORAL at 21:42

## 2018-12-19 RX ADMIN — METOPROLOL TARTRATE 25 MG: 25 TABLET ORAL at 21:42

## 2018-12-19 ASSESSMENT — PAIN DESCRIPTION - ONSET: ONSET: ON-GOING

## 2018-12-19 ASSESSMENT — PAIN DESCRIPTION - FREQUENCY: FREQUENCY: CONTINUOUS

## 2018-12-19 ASSESSMENT — PAIN DESCRIPTION - DESCRIPTORS: DESCRIPTORS: ACHING

## 2018-12-19 ASSESSMENT — PAIN DESCRIPTION - PROGRESSION: CLINICAL_PROGRESSION: GRADUALLY WORSENING

## 2018-12-19 ASSESSMENT — PAIN DESCRIPTION - ORIENTATION: ORIENTATION: LEFT;ANTERIOR

## 2018-12-19 ASSESSMENT — ENCOUNTER SYMPTOMS
VOMITING: 0
DIARRHEA: 0
NAUSEA: 0
CONSTIPATION: 0
COUGH: 0
SHORTNESS OF BREATH: 0

## 2018-12-19 ASSESSMENT — PAIN DESCRIPTION - PAIN TYPE: TYPE: ACUTE PAIN;SURGICAL PAIN

## 2018-12-19 ASSESSMENT — PAIN DESCRIPTION - LOCATION: LOCATION: HEAD

## 2018-12-19 ASSESSMENT — PAIN SCALES - GENERAL: PAINLEVEL_OUTOF10: 4

## 2018-12-19 NOTE — PROGRESS NOTES
Progress Note    Admit Date: 12/18/2018  Day: 1  Diet: DIET GENERAL; Interval history: Ms Amanda Huang is an 80year old female with PMH of Afib, HTN, HLD, Afib (not on AC), NPH (s/p shunt in 2016), and SDH (last admission 11/29, s/p SDH evac) who presented to an outside hospital with altered mental status. CT head showed increase in size of ventricles. Subjective: Patient seen and examined at bedside this morning. No overnight events. She is alert and oriented to person and place. No obvious focal neurological deficits (chronic right sided weakness). Complains of mild headache. Denies SOB, CP, palpitations, N/V/D. Medications:     Scheduled Meds:   sodium chloride flush  10 mL Intravenous 2 times per day    cefTRIAXone (ROCEPHIN) IV  1 g Intravenous Q24H     Continuous Infusions:   dextrose 5 % and 0.45 % NaCl 75 mL/hr at 12/19/18 1059     PRN Meds:sodium chloride flush, ondansetron    Objective:   Vitals:   T-max:  Patient Vitals for the past 8 hrs:   BP Temp Temp src Pulse Resp SpO2   12/19/18 1429 115/73 98 °F (36.7 °C) Oral 67 16 96 %   12/19/18 1014 (!) 148/76 97.6 °F (36.4 °C) Oral 86 16 98 %   12/19/18 0745 (!) 144/84 97.7 °F (36.5 °C) Oral 83 16 97 %     No intake or output data in the 24 hours ending 12/19/18 1515    Physical Exam   Constitutional: She appears well-developed. No distress. HENT:   Head: Normocephalic. Eyes: Pupils are equal, round, and reactive to light. EOM are normal.   Neck: Normal range of motion. Cardiovascular: Normal rate and regular rhythm. Pulmonary/Chest: Effort normal and breath sounds normal.   Abdominal: Soft. Bowel sounds are normal.   Musculoskeletal: Normal range of motion. Neurological: She is alert. She displays normal reflexes. A cranial nerve deficit is present. No sensory deficit. She exhibits normal muscle tone. Coordination normal.   Oriented to person and place. Skin: Skin is warm and dry.          LABS:    CBC: Recent Labs      12/18/18

## 2018-12-19 NOTE — CONSULTS
Neurosurgery Consult:    Patient Name: Devonte Galarza YOB: 1933   Sex: Female Age: 80 yrs     Medical Record Number: [id number] Acct Number: [de-identified]   Room Number: 7251/2780-23 Hospital Day: Hospital Day: 2     Requesting physician: Maribel Jones MD    Reason for consultation: NPH w/  Shunt and ventriculomegaly       History of present illness: Patient is a 80 y.o. female w/ PMH of HTN, HLD, NPH s/p  shunt, cSDH s/p bilateral evacuation who presented on 12/19/2018 w/ worsening mental status to OSH. Head CT showed worsening ventriculomegaly compared to previous admission w/ improvement in subdural collections. Mental status has improved over last 24 hours w/ treating UTI. Neurosurgery consulted for recommendations on shunt management. ROS:   Unable to assess d/t mental status       VITAL SIGNS   BP (!) 148/76   Pulse 86   Temp 97.6 °F (36.4 °C) (Oral)   Resp 16   SpO2 98%    Height     Weight          Allergies No Known Allergies   NPO Status DIET GENERAL;   Isolation No active isolations     MEDICAL HISTORY   Past Medical History       Diagnosis Date    Atrial fibrillation (HCC)     Dyspnea     Hereditary and idiopathic neuropathy     Hydrocephalus     Hyperlipidemia     Hypertension     Localized edema     Muscle weakness (generalized)     Syncope and collapse     Thyroid disease     Unspecified kidney failure     UTI (urinary tract infection)       Surgical History    has a past surgical history that includes Hysterectomy; Cholecystectomy; csf shunt; and pr gregory hole infratentor, explore (Bilateral, 11/27/2018). Social History   Social History     Occupational History    Not on file. Social History Main Topics    Smoking status: Never Smoker    Smokeless tobacco: Never Used    Alcohol use No    Drug use: No    Sexual activity: Not on file        The medical history was obtained from the patient & the medical records.  The nursing notes, primary

## 2018-12-19 NOTE — PLAN OF CARE
Problem: Falls - Risk of:  Goal: Will remain free from falls  Will remain free from falls   Outcome: Met This Shift  Pt has been free from falls this shift, bed alarm on, bed in lowest position, 2/4 side rails up, nonskid socks on, wheels locked, bedside table and call light in reach. Encouraged pt to call out if needed anything. Problem: Risk for Impaired Skin Integrity  Goal: Tissue integrity - skin and mucous membranes  Structural intactness and normal physiological function of skin and  mucous membranes. Outcome: Ongoing  Pt is being turned and repositioned q2h with pillow support, pt is incontinent and is being changed frequently. Buttocks is reddened, applied barrier cream. Pt has scattered bruising. Will continue to assess skin integrity.

## 2018-12-19 NOTE — PROGRESS NOTES
Speech Language Pathology  Dysphagia - hold    Order received, chart reviewed, d/w RN. Awaiting neurosurgery consult to be cleared for PO. RN will notify SLP after cleared. Paula Grey M.S./[facility]-SLP #[id number]  Pg.  # [phone] 10:23 AM

## 2018-12-19 NOTE — PROGRESS NOTES
Pt alert and oriented x3, VSS, IV ns locked in right AC. Neuro checks are WDL, pt pleasantly confused. Pt able to follow commands. Bed alarm on, camera on for better monitoring, bedside table and call light in reach.

## 2018-12-19 NOTE — PROGRESS NOTES
Speech Language Pathology  Facility/Department: [facility] 5T ORTHO/NEURO   BEDSIDE SWALLOW EVALUATION/Treatment note    NAME: Miguelito Villa  : 1933  MRN: [id number]    ADMISSION DATE: 2018  ADMITTING DIAGNOSIS: has Subdural hematoma ([address] Atrial fibrillation with slow ventricular response ([address] Chronic subdural hematoma ([address] Nonintractable headache; Confusion; Generalized weakness; Encounter for palliative care; and DNR (do not resuscitate) on her problem list.  ONSET DATE: 18    Recent Chest Xray 18  Low lung volumes with bibasilar atelectasis. CT of head 18  Impression:       Stable bilateral mixed attenuation subdural fluid collections.       Stable hydrocephalus with right-sided ventriculostomy catheter.         Date of Eval: 2018  Evaluating Therapist: Carley López    Current Diet level:  Current Diet : NPO (was on regular diet prior to admit)  Current Liquid Diet : NPO    Primary Complaint  Patient Complaint: daughter states pt has had no problems with swallowing    Pain:  Pain Assessment  Patient Currently in Pain: No    Reason for Referral  Miguelito Villa was referred for a bedside swallow evaluation to assess the efficiency of her swallow function, identify signs and symptoms of aspiration and make recommendations regarding safe dietary consistencies, effective compensatory strategies, and safe eating environment. Impression  Dysphagia Diagnosis: Swallow function appears grossly intact  Dysphagia Impression : pt admitted with increased confusion. Pt did have a fall, CT concerning for worsening SDH. Daughter states MD told her the confusion most likely due to UTI. Daughter reports no problems with swallowing prior to admit. Pt was alert and oriented to hospital, but not time, with max cues provided. Per palliative care notes, daughter states pt has had cognitive deficits for many months. Pt demonstrated cough and swallow on command.    Presented teaspoon; Ice Chips;Puree    Vision/Hearing  Vision  Vision: Within Functional Limits  Hearing  Hearing: Within functional limits    Oral Motor Deficits  Oral/Motor  Oral Motor: Within functional limits    Oral Phase Dysfunction  Oral Phase  Oral Phase: WFL     Indicators of Pharyngeal Phase Dysfunction   Pharyngeal Phase  Pharyngeal Phase: WFL    Prognosis  Prognosis  Prognosis for safe diet advancement: good  Individuals consulted  Consulted and agree with results and recommendations: Patient; Family member;RN  Family member consulted: daughter    Education  Patient Education: pt /daughter educated to purpose of visit  Patient Education Response: Verbalizes understanding    G-Code  SLP G-Codes  Functional Limitations: Swallowing  Swallow Current Status (): 0 percent impaired, limited or restricted  Swallow Goal Status (): 0 percent impaired, limited or restricted    Therapy Time  SLP Individual Minutes  Time In: 1137  Time Out: 1200  Minutes: 15 eval 8 treat    Plan:  Recommended diet: regular diet/thin liquids - if any s/s of aspiration emerge, or there is respiratory decline,  make NPO until further evaluated by SLP  Dc recommendation: pt will most likely not require f/u at dc  Pt therapy goal: pt did not state  Pt dc goal:pt did not state      Mitzy Thomas M.S./[facility]-SLP #[id number]  Pg.  # [phone]    Needs met prior to leaving room, call light within reach, d/w GERARDO Ambrocio  This document will serve as a dc summary if pt dc prior to next visit  12/19/2018 12:02 PM

## 2018-12-19 NOTE — CONSULTS
[facility]  Palliative Medicine Consultation Note      Date Of Admission:12/18/2018  Date of consult: 12/19/18  Seen by [facility] in the past:  No    Recommendations:        Pt is alert and oriented x2, does not have good insight into her medical problems. Spoke with pt's daughter/stu Franklin at the bedside and introduced palliative care. Discussed code status and Marilu Lockhart says pt would not want to undergo resuscitation or intubation, and wants to change code status to [facility]. The plan is to continue current treatment and have pt return to [facility]. 1. Goals of Care/Advanced Care planning/Code status: changed to [facility] per pt/family wishes. As a DNR/CC the main focus of care is comfort and this patient will receive any conservative medical management to alleviate pain or suffering. No accelerated care including CPR, intubation, defibrillation or medication administration is desired to sustain life. Daughter is in favor of comfort care and wants to continue current treatment in hopes that pt can return to rehab and continue working on regaining her strength to be able to return to her assisted living apartment. 2. Pain: pt reports a mild headache, unable to rate or describe further. She does not want any medication for the headache and agrees to let us know if it worsens. No other pain. 3. SOB: pt denies sob and appears comfortable on room air. 4. Altered mental status: pt is alert to person, knows she's in a hospital but not which one, does not know the month, year, or situation. Presented with increased confusion, likely due to UTI. Daughter reports her mental status is improved since yesterday but not back to her baseline. She reports that pt has had cognitive deficits for many months. 5. Generalized weakness: pt has been working with PT/OT at [facility] prior to admission, after her admission last month. The goal is to get her back to her assisted living apartment. PT/OT eval pending.   6.

## 2018-12-19 NOTE — ED NOTES
Pt to be transferred to [facility]. for a SDH. Awaiting room assignment and then transport time. Pt resting at present. VS remain stable. Comfort measures maintained. Side rails x2 up with call bell in reach.      Liyah Brandon RN  12/18/18 0145

## 2018-12-20 VITALS
HEIGHT: 66 IN | BODY MASS INDEX: 25.23 KG/M2 | SYSTOLIC BLOOD PRESSURE: 120 MMHG | RESPIRATION RATE: 17 BRPM | TEMPERATURE: 97.5 F | DIASTOLIC BLOOD PRESSURE: 71 MMHG | WEIGHT: 156.97 LBS | OXYGEN SATURATION: 94 % | HEART RATE: 76 BPM

## 2018-12-20 LAB
BASOPHILS ABSOLUTE: 0 K/UL (ref 0–0.2)
BASOPHILS RELATIVE PERCENT: 0.3 %
EOSINOPHILS ABSOLUTE: 0 K/UL (ref 0–0.6)
EOSINOPHILS RELATIVE PERCENT: 0.1 %
HCT VFR BLD CALC: 43.4 % (ref 36–48)
HEMOGLOBIN: 14.2 G/DL (ref 12–16)
LYMPHOCYTES ABSOLUTE: 0.7 K/UL (ref 1–5.1)
LYMPHOCYTES RELATIVE PERCENT: 9.8 %
MCH RBC QN AUTO: 27.7 PG (ref 26–34)
MCHC RBC AUTO-ENTMCNC: 32.6 G/DL (ref 31–36)
MCV RBC AUTO: 84.9 FL (ref 80–100)
MONOCYTES ABSOLUTE: 0.2 K/UL (ref 0–1.3)
MONOCYTES RELATIVE PERCENT: 3.4 %
NEUTROPHILS ABSOLUTE: 6.2 K/UL (ref 1.7–7.7)
NEUTROPHILS RELATIVE PERCENT: 86.4 %
PDW BLD-RTO: 17 % (ref 12.4–15.4)
PLATELET # BLD: 355 K/UL (ref 135–450)
PMV BLD AUTO: 8.3 FL (ref 5–10.5)
RBC # BLD: 5.11 M/UL (ref 4–5.2)
URINE CULTURE, ROUTINE: NORMAL
WBC # BLD: 7.1 K/UL (ref 4–11)

## 2018-12-20 PROCEDURE — 92526 ORAL FUNCTION THERAPY: CPT

## 2018-12-20 PROCEDURE — 36415 COLL VENOUS BLD VENIPUNCTURE: CPT

## 2018-12-20 PROCEDURE — 6370000000 HC RX 637 (ALT 250 FOR IP): Performed by: STUDENT IN AN ORGANIZED HEALTH CARE EDUCATION/TRAINING PROGRAM

## 2018-12-20 PROCEDURE — 85025 COMPLETE CBC W/AUTO DIFF WBC: CPT

## 2018-12-20 PROCEDURE — 2580000003 HC RX 258: Performed by: INTERNAL MEDICINE

## 2018-12-20 PROCEDURE — 2580000003 HC RX 258: Performed by: STUDENT IN AN ORGANIZED HEALTH CARE EDUCATION/TRAINING PROGRAM

## 2018-12-20 PROCEDURE — 6360000002 HC RX W HCPCS: Performed by: NURSE PRACTITIONER

## 2018-12-20 RX ORDER — METOPROLOL SUCCINATE 50 MG/1
50 TABLET, EXTENDED RELEASE ORAL DAILY
Qty: 30 TABLET | Refills: 3 | Status: SHIPPED | OUTPATIENT
Start: 2018-12-20 | End: 2019-07-29 | Stop reason: CLARIF

## 2018-12-20 RX ORDER — METOPROLOL SUCCINATE 25 MG/1
50 TABLET, EXTENDED RELEASE ORAL DAILY
Qty: 30 TABLET | Refills: 3 | Status: SHIPPED | OUTPATIENT
Start: 2018-12-20 | End: 2018-12-20

## 2018-12-20 RX ORDER — DEXAMETHASONE 2 MG/1
2 TABLET ORAL 2 TIMES DAILY
Qty: 20 TABLET | Refills: 0 | Status: SHIPPED | OUTPATIENT
Start: 2018-12-20 | End: 2018-12-30

## 2018-12-20 RX ORDER — CEFUROXIME AXETIL 250 MG/1
250 TABLET ORAL 2 TIMES DAILY
Qty: 14 TABLET | Refills: 0 | Status: SHIPPED | OUTPATIENT
Start: 2018-12-20 | End: 2018-12-27

## 2018-12-20 RX ORDER — PANTOPRAZOLE SODIUM 20 MG/1
20 TABLET, DELAYED RELEASE ORAL
Qty: 90 TABLET | Refills: 1 | Status: ON HOLD | OUTPATIENT
Start: 2018-12-20 | End: 2019-08-01 | Stop reason: HOSPADM

## 2018-12-20 RX ADMIN — CALCIUM 500 MG: 500 TABLET ORAL at 08:10

## 2018-12-20 RX ADMIN — HYDROCHLOROTHIAZIDE 25 MG: 25 TABLET ORAL at 08:10

## 2018-12-20 RX ADMIN — METOPROLOL TARTRATE 25 MG: 25 TABLET ORAL at 08:10

## 2018-12-20 RX ADMIN — Medication 10 ML: at 08:10

## 2018-12-20 RX ADMIN — POLYETHYLENE GLYCOL (3350) 17 G: 17 POWDER, FOR SOLUTION ORAL at 08:09

## 2018-12-20 RX ADMIN — DEXTROSE AND SODIUM CHLORIDE: 5; 450 INJECTION, SOLUTION INTRAVENOUS at 00:52

## 2018-12-20 RX ADMIN — ATORVASTATIN CALCIUM 20 MG: 20 TABLET, FILM COATED ORAL at 08:10

## 2018-12-20 RX ADMIN — LEVOTHYROXINE SODIUM 150 MCG: 150 TABLET ORAL at 06:47

## 2018-12-20 RX ADMIN — DEXAMETHASONE 2 MG: 0.5 TABLET ORAL at 08:09

## 2018-12-20 NOTE — PROGRESS NOTES
[facility] [address] [facility]         Progress Note          NAME:  Daniel Denny RECORD NUMBER:  [id number]  : 1933  TODAY'S DATE:  2018    PLAN: Palliative Care will continue to follow patient. Current Code Status: DNR-CC    Advanced Care planning completion: Yes-daughter Noemi    Discharge Environment Planned:   [] Hospice Consult Agency:  [] Inpatient Hospice    [] Home with Hospice Care   [] ECF with Hospice  [] ECF skilled care with Hospice to follow   [x] Other:[facility]    Subjective: Awake, alert and oriented to person. Denies complaints of pain or nausea. Objective: Lying in bed. Appears comfortable. Daughter present. Medical Resident in to inform patient that she will be discharging back to [address] today. Daughter was concerned that patient had not spent 3 midnights in hospitalization. Resident was notifying the floor CM to evaluate this. Spoke to [employer] and informed her that daughter had requested CM speak to her about patient discharging to SNF level of care after 2 midnights. She will speak with patient's daughter. Palliative Care will continue to follow patient. Placed 2 copies of DNR-CC form on patient's chart. Vitals:    18 0715   BP: (!) 156/83   Pulse: 95   Resp: 16   Temp: 97.5 °F (36.4 °C)   SpO2: 92%     No intake/output data recorded. I/O this shift:  In: 240 [P.O.:240]  Out: -       Time spent with patient/family, discharge plan creation/execution:  30 minutes    We will continue to follow Ms. Bello's care as needed. Thank you for allowing the Palliative Care team to participate in the care of Ms. Alise Dakin. EDWARD Lee, [address] [place] [address]  [phone]    RN/CM or SW on the designated floor is coordinating and executing discharge for this patient.

## 2018-12-20 NOTE — CARE COORDINATION
[facility] today: 1:00 pm  Nurse Report: [phone] and patient's nurse to let them of discharge.    Electronically signed by Marlen Robison RN on 12/20/2018 at 10:57 AM
want to participate in local refill/meds to beds program?: No    Goals of Care  Patient expects to be discharged to[de-identified] SNF  Patient plans for SNF: [address]F         Mode of transport from hospital: TBD    Factors facilitating achievement of predicted outcomes: Family support, Cooperative and Pleasant    Barriers to discharge: none noted     Brooks Dominguez RN  [facility]  Case Management Department  Ph: [phone] Fax: [phone]

## 2019-07-29 ENCOUNTER — APPOINTMENT (OUTPATIENT)
Dept: CT IMAGING | Age: 84
DRG: 100 | End: 2019-07-29
Payer: MEDICARE

## 2019-07-29 ENCOUNTER — APPOINTMENT (OUTPATIENT)
Dept: GENERAL RADIOLOGY | Age: 84
DRG: 100 | End: 2019-07-29
Payer: MEDICARE

## 2019-07-29 ENCOUNTER — HOSPITAL ENCOUNTER (INPATIENT)
Age: 84
LOS: 2 days | Discharge: HOSPICE | DRG: 100 | End: 2019-07-31
Attending: EMERGENCY MEDICINE | Admitting: INTERNAL MEDICINE
Payer: MEDICARE

## 2019-07-29 DIAGNOSIS — G25.3 MYOCLONIC JERKING: ICD-10-CM

## 2019-07-29 DIAGNOSIS — N39.0 URINARY TRACT INFECTION WITH HEMATURIA, SITE UNSPECIFIED: ICD-10-CM

## 2019-07-29 DIAGNOSIS — R31.9 URINARY TRACT INFECTION WITH HEMATURIA, SITE UNSPECIFIED: ICD-10-CM

## 2019-07-29 DIAGNOSIS — R41.82 ALTERED MENTAL STATUS, UNSPECIFIED ALTERED MENTAL STATUS TYPE: Primary | ICD-10-CM

## 2019-07-29 PROBLEM — R55 SYNCOPE AND COLLAPSE: Status: ACTIVE | Noted: 2019-07-29

## 2019-07-29 LAB
ALBUMIN SERPL-MCNC: 3.4 G/DL (ref 3.4–5)
ALP BLD-CCNC: 72 U/L (ref 40–129)
ALT SERPL-CCNC: 45 U/L (ref 10–40)
ANION GAP SERPL CALCULATED.3IONS-SCNC: 14 MMOL/L (ref 3–16)
AST SERPL-CCNC: 68 U/L (ref 15–37)
BACTERIA: ABNORMAL /HPF
BASE EXCESS VENOUS: 0 (ref -3–3)
BASOPHILS ABSOLUTE: 0.1 K/UL (ref 0–0.2)
BASOPHILS RELATIVE PERCENT: 1.1 %
BILIRUB SERPL-MCNC: 0.5 MG/DL (ref 0–1)
BILIRUBIN DIRECT: <0.2 MG/DL (ref 0–0.3)
BILIRUBIN URINE: NEGATIVE
BILIRUBIN, INDIRECT: ABNORMAL MG/DL (ref 0–1)
BLOOD, URINE: NEGATIVE
BUN BLDV-MCNC: 21 MG/DL (ref 7–20)
CALCIUM SERPL-MCNC: 9.1 MG/DL (ref 8.3–10.6)
CHLORIDE BLD-SCNC: 107 MMOL/L (ref 99–110)
CLARITY: CLEAR
CO2: 23 MMOL/L (ref 21–32)
COLOR: YELLOW
CREAT SERPL-MCNC: 1 MG/DL (ref 0.6–1.2)
EKG ATRIAL RATE: 56 BPM
EKG ATRIAL RATE: 66 BPM
EKG DIAGNOSIS: NORMAL
EKG DIAGNOSIS: NORMAL
EKG Q-T INTERVAL: 392 MS
EKG Q-T INTERVAL: 408 MS
EKG QRS DURATION: 78 MS
EKG QRS DURATION: 78 MS
EKG QTC CALCULATION (BAZETT): 397 MS
EKG QTC CALCULATION (BAZETT): 424 MS
EKG R AXIS: -48 DEGREES
EKG R AXIS: -54 DEGREES
EKG T AXIS: 142 DEGREES
EKG T AXIS: 142 DEGREES
EKG VENTRICULAR RATE: 62 BPM
EKG VENTRICULAR RATE: 65 BPM
EOSINOPHILS ABSOLUTE: 0.1 K/UL (ref 0–0.6)
EOSINOPHILS RELATIVE PERCENT: 2.5 %
EPITHELIAL CELLS, UA: ABNORMAL /HPF
GFR AFRICAN AMERICAN: >60
GFR NON-AFRICAN AMERICAN: 53
GLUCOSE BLD-MCNC: 118 MG/DL (ref 70–99)
GLUCOSE URINE: NEGATIVE MG/DL
HCO3 VENOUS: 25.4 MMOL/L (ref 23–29)
HCT VFR BLD CALC: 36.3 % (ref 36–48)
HEMOGLOBIN: 12 G/DL (ref 12–16)
INR BLD: 1.1 (ref 0.86–1.14)
KETONES, URINE: NEGATIVE MG/DL
LACTATE: 3.07 MMOL/L (ref 0.4–2)
LACTIC ACID: 3.3 MMOL/L (ref 0.4–2)
LEUKOCYTE ESTERASE, URINE: ABNORMAL
LIPASE: 53 U/L (ref 13–60)
LYMPHOCYTES ABSOLUTE: 1.7 K/UL (ref 1–5.1)
LYMPHOCYTES RELATIVE PERCENT: 34.6 %
MCH RBC QN AUTO: 31.5 PG (ref 26–34)
MCHC RBC AUTO-ENTMCNC: 32.9 G/DL (ref 31–36)
MCV RBC AUTO: 95.8 FL (ref 80–100)
METHEMOGLOBIN VENOUS: 0.7 %
MICROSCOPIC EXAMINATION: YES
MONOCYTES ABSOLUTE: 0.4 K/UL (ref 0–1.3)
MONOCYTES RELATIVE PERCENT: 7.3 %
NEUTROPHILS ABSOLUTE: 2.6 K/UL (ref 1.7–7.7)
NEUTROPHILS RELATIVE PERCENT: 54.5 %
NITRITE, URINE: POSITIVE
O2 SAT, VEN: 66 %
PCO2, VEN: 46.3 MM HG (ref 40–50)
PDW BLD-RTO: 19.6 % (ref 12.4–15.4)
PERFORMED ON: ABNORMAL
PH UA: 6.5 (ref 5–8)
PH VENOUS: 7.35 (ref 7.35–7.45)
PLATELET # BLD: 150 K/UL (ref 135–450)
PMV BLD AUTO: 8.4 FL (ref 5–10.5)
PO2, VEN: 36 MM HG
POC SAMPLE TYPE: ABNORMAL
POTASSIUM REFLEX MAGNESIUM: 4.2 MMOL/L (ref 3.5–5.1)
PRO-BNP: 111 PG/ML (ref 0–449)
PROTEIN UA: ABNORMAL MG/DL
PROTHROMBIN TIME: 12.5 SEC (ref 9.8–13)
RBC # BLD: 3.79 M/UL (ref 4–5.2)
RBC UA: ABNORMAL /HPF (ref 0–2)
SODIUM BLD-SCNC: 144 MMOL/L (ref 136–145)
SPECIFIC GRAVITY UA: 1.01 (ref 1–1.03)
TCO2 CALC VENOUS: 27 MMOL/L
TOTAL PROTEIN: 6.4 G/DL (ref 6.4–8.2)
TROPONIN: 0.16 NG/ML
TROPONIN: 0.18 NG/ML
URINE TYPE: ABNORMAL
UROBILINOGEN, URINE: 0.2 E.U./DL
WBC # BLD: 4.8 K/UL (ref 4–11)
WBC UA: ABNORMAL /HPF (ref 0–5)

## 2019-07-29 PROCEDURE — 93005 ELECTROCARDIOGRAM TRACING: CPT | Performed by: EMERGENCY MEDICINE

## 2019-07-29 PROCEDURE — 80076 HEPATIC FUNCTION PANEL: CPT

## 2019-07-29 PROCEDURE — 83690 ASSAY OF LIPASE: CPT

## 2019-07-29 PROCEDURE — 71046 X-RAY EXAM CHEST 2 VIEWS: CPT

## 2019-07-29 PROCEDURE — 6360000004 HC RX CONTRAST MEDICATION: Performed by: EMERGENCY MEDICINE

## 2019-07-29 PROCEDURE — 80048 BASIC METABOLIC PNL TOTAL CA: CPT

## 2019-07-29 PROCEDURE — 87086 URINE CULTURE/COLONY COUNT: CPT

## 2019-07-29 PROCEDURE — 96365 THER/PROPH/DIAG IV INF INIT: CPT

## 2019-07-29 PROCEDURE — 83880 ASSAY OF NATRIURETIC PEPTIDE: CPT

## 2019-07-29 PROCEDURE — 87040 BLOOD CULTURE FOR BACTERIA: CPT

## 2019-07-29 PROCEDURE — 99291 CRITICAL CARE FIRST HOUR: CPT

## 2019-07-29 PROCEDURE — 36415 COLL VENOUS BLD VENIPUNCTURE: CPT

## 2019-07-29 PROCEDURE — 87077 CULTURE AEROBIC IDENTIFY: CPT

## 2019-07-29 PROCEDURE — 87186 SC STD MICRODIL/AGAR DIL: CPT

## 2019-07-29 PROCEDURE — 70496 CT ANGIOGRAPHY HEAD: CPT

## 2019-07-29 PROCEDURE — 83605 ASSAY OF LACTIC ACID: CPT

## 2019-07-29 PROCEDURE — 70498 CT ANGIOGRAPHY NECK: CPT

## 2019-07-29 PROCEDURE — 2060000000 HC ICU INTERMEDIATE R&B

## 2019-07-29 PROCEDURE — 83050 HGB METHEMOGLOBIN QUAN: CPT

## 2019-07-29 PROCEDURE — 70450 CT HEAD/BRAIN W/O DYE: CPT

## 2019-07-29 PROCEDURE — 96375 TX/PRO/DX INJ NEW DRUG ADDON: CPT

## 2019-07-29 PROCEDURE — 6360000002 HC RX W HCPCS: Performed by: STUDENT IN AN ORGANIZED HEALTH CARE EDUCATION/TRAINING PROGRAM

## 2019-07-29 PROCEDURE — 85025 COMPLETE CBC W/AUTO DIFF WBC: CPT

## 2019-07-29 PROCEDURE — 84484 ASSAY OF TROPONIN QUANT: CPT

## 2019-07-29 PROCEDURE — 82803 BLOOD GASES ANY COMBINATION: CPT

## 2019-07-29 PROCEDURE — 95951 HC EEG MONITOR/VIDEO LESS THAN 24: CPT

## 2019-07-29 PROCEDURE — 81001 URINALYSIS AUTO W/SCOPE: CPT

## 2019-07-29 PROCEDURE — 85610 PROTHROMBIN TIME: CPT

## 2019-07-29 PROCEDURE — 6360000002 HC RX W HCPCS: Performed by: EMERGENCY MEDICINE

## 2019-07-29 PROCEDURE — 96361 HYDRATE IV INFUSION ADD-ON: CPT

## 2019-07-29 PROCEDURE — 2580000003 HC RX 258: Performed by: EMERGENCY MEDICINE

## 2019-07-29 PROCEDURE — 2580000003 HC RX 258: Performed by: STUDENT IN AN ORGANIZED HEALTH CARE EDUCATION/TRAINING PROGRAM

## 2019-07-29 RX ORDER — ACETAMINOPHEN 325 MG/1
650 TABLET ORAL EVERY 4 HOURS PRN
Status: ON HOLD | COMMUNITY
End: 2019-08-01 | Stop reason: HOSPADM

## 2019-07-29 RX ORDER — ONDANSETRON 2 MG/ML
4 INJECTION INTRAMUSCULAR; INTRAVENOUS EVERY 6 HOURS PRN
Status: DISCONTINUED | OUTPATIENT
Start: 2019-07-29 | End: 2019-07-31 | Stop reason: HOSPADM

## 2019-07-29 RX ORDER — METOPROLOL SUCCINATE 50 MG/1
50 TABLET, EXTENDED RELEASE ORAL DAILY
Status: DISCONTINUED | OUTPATIENT
Start: 2019-07-30 | End: 2019-07-30

## 2019-07-29 RX ORDER — SODIUM CHLORIDE, SODIUM LACTATE, POTASSIUM CHLORIDE, AND CALCIUM CHLORIDE .6; .31; .03; .02 G/100ML; G/100ML; G/100ML; G/100ML
500 INJECTION, SOLUTION INTRAVENOUS ONCE
Status: COMPLETED | OUTPATIENT
Start: 2019-07-30 | End: 2019-07-30

## 2019-07-29 RX ORDER — HEPARIN SODIUM 10000 [USP'U]/100ML
9 INJECTION, SOLUTION INTRAVENOUS CONTINUOUS
Status: DISCONTINUED | OUTPATIENT
Start: 2019-07-29 | End: 2019-07-30

## 2019-07-29 RX ORDER — SODIUM CHLORIDE 0.9 % (FLUSH) 0.9 %
10 SYRINGE (ML) INJECTION EVERY 12 HOURS SCHEDULED
Status: DISCONTINUED | OUTPATIENT
Start: 2019-07-29 | End: 2019-07-31 | Stop reason: HOSPADM

## 2019-07-29 RX ORDER — SODIUM PHOSPHATE, DIBASIC AND SODIUM PHOSPHATE, MONOBASIC 7; 19 G/133ML; G/133ML
1 ENEMA RECTAL DAILY PRN
Status: DISCONTINUED | OUTPATIENT
Start: 2019-07-29 | End: 2019-07-30

## 2019-07-29 RX ORDER — SENNA PLUS 8.6 MG/1
1 TABLET ORAL DAILY PRN
Status: ON HOLD | COMMUNITY
End: 2019-08-01 | Stop reason: HOSPADM

## 2019-07-29 RX ORDER — SODIUM CHLORIDE, SODIUM LACTATE, POTASSIUM CHLORIDE, AND CALCIUM CHLORIDE .6; .31; .03; .02 G/100ML; G/100ML; G/100ML; G/100ML
500 INJECTION, SOLUTION INTRAVENOUS ONCE
Status: COMPLETED | OUTPATIENT
Start: 2019-07-29 | End: 2019-07-29

## 2019-07-29 RX ORDER — SODIUM CHLORIDE, SODIUM LACTATE, POTASSIUM CHLORIDE, CALCIUM CHLORIDE 600; 310; 30; 20 MG/100ML; MG/100ML; MG/100ML; MG/100ML
INJECTION, SOLUTION INTRAVENOUS CONTINUOUS
Status: DISCONTINUED | OUTPATIENT
Start: 2019-07-30 | End: 2019-07-30

## 2019-07-29 RX ORDER — POTASSIUM CHLORIDE 750 MG/1
10 TABLET, FILM COATED, EXTENDED RELEASE ORAL DAILY
Status: ON HOLD | COMMUNITY
End: 2019-08-01 | Stop reason: HOSPADM

## 2019-07-29 RX ORDER — HEPARIN SODIUM 5000 [USP'U]/ML
4000 INJECTION, SOLUTION INTRAVENOUS; SUBCUTANEOUS PRN
Status: DISCONTINUED | OUTPATIENT
Start: 2019-07-29 | End: 2019-07-30 | Stop reason: ALTCHOICE

## 2019-07-29 RX ORDER — METOPROLOL SUCCINATE 50 MG/1
50 TABLET, EXTENDED RELEASE ORAL DAILY
Status: ON HOLD | COMMUNITY
End: 2019-08-01 | Stop reason: HOSPADM

## 2019-07-29 RX ORDER — FUROSEMIDE 40 MG/1
40 TABLET ORAL DAILY
Status: ON HOLD | COMMUNITY
End: 2019-08-01 | Stop reason: HOSPADM

## 2019-07-29 RX ORDER — HEPARIN SODIUM 5000 [USP'U]/ML
4000 INJECTION, SOLUTION INTRAVENOUS; SUBCUTANEOUS ONCE
Status: COMPLETED | OUTPATIENT
Start: 2019-07-29 | End: 2019-07-29

## 2019-07-29 RX ORDER — SODIUM CHLORIDE, SODIUM LACTATE, POTASSIUM CHLORIDE, CALCIUM CHLORIDE 600; 310; 30; 20 MG/100ML; MG/100ML; MG/100ML; MG/100ML
1000 INJECTION, SOLUTION INTRAVENOUS ONCE
Status: DISCONTINUED | OUTPATIENT
Start: 2019-07-29 | End: 2019-07-29

## 2019-07-29 RX ORDER — ACETAMINOPHEN 325 MG/1
650 TABLET ORAL EVERY 4 HOURS PRN
Status: DISCONTINUED | OUTPATIENT
Start: 2019-07-29 | End: 2019-07-31 | Stop reason: HOSPADM

## 2019-07-29 RX ORDER — ATORVASTATIN CALCIUM 20 MG/1
20 TABLET, FILM COATED ORAL DAILY
Status: ON HOLD | COMMUNITY
End: 2019-08-01 | Stop reason: HOSPADM

## 2019-07-29 RX ORDER — PANTOPRAZOLE SODIUM 20 MG/1
20 TABLET, DELAYED RELEASE ORAL
Status: DISCONTINUED | OUTPATIENT
Start: 2019-07-30 | End: 2019-07-30

## 2019-07-29 RX ORDER — BISACODYL 10 MG
10 SUPPOSITORY, RECTAL RECTAL DAILY PRN
Status: DISCONTINUED | OUTPATIENT
Start: 2019-07-29 | End: 2019-07-30

## 2019-07-29 RX ORDER — SODIUM CHLORIDE 0.9 % (FLUSH) 0.9 %
10 SYRINGE (ML) INJECTION PRN
Status: DISCONTINUED | OUTPATIENT
Start: 2019-07-29 | End: 2019-07-31 | Stop reason: HOSPADM

## 2019-07-29 RX ORDER — SENNA PLUS 8.6 MG/1
1 TABLET ORAL DAILY PRN
Status: DISCONTINUED | OUTPATIENT
Start: 2019-07-29 | End: 2019-07-30

## 2019-07-29 RX ORDER — HEPARIN SODIUM 5000 [USP'U]/ML
2000 INJECTION, SOLUTION INTRAVENOUS; SUBCUTANEOUS PRN
Status: DISCONTINUED | OUTPATIENT
Start: 2019-07-29 | End: 2019-07-30 | Stop reason: ALTCHOICE

## 2019-07-29 RX ADMIN — SODIUM CHLORIDE, POTASSIUM CHLORIDE, SODIUM LACTATE AND CALCIUM CHLORIDE 500 ML: 600; 310; 30; 20 INJECTION, SOLUTION INTRAVENOUS at 15:44

## 2019-07-29 RX ADMIN — HEPARIN SODIUM 4000 UNITS: 5000 INJECTION, SOLUTION INTRAVENOUS; SUBCUTANEOUS at 19:38

## 2019-07-29 RX ADMIN — CEFEPIME 2 G: 2 INJECTION, POWDER, FOR SOLUTION INTRAMUSCULAR; INTRAVENOUS at 19:15

## 2019-07-29 RX ADMIN — VANCOMYCIN HYDROCHLORIDE 1500 MG: 10 INJECTION, POWDER, LYOPHILIZED, FOR SOLUTION INTRAVENOUS at 21:44

## 2019-07-29 RX ADMIN — HEPARIN SODIUM 12 UNITS/KG/HR: 10000 INJECTION, SOLUTION INTRAVENOUS at 19:38

## 2019-07-29 RX ADMIN — IOPAMIDOL 80 ML: 755 INJECTION, SOLUTION INTRAVENOUS at 15:35

## 2019-07-29 RX ADMIN — SODIUM CHLORIDE, POTASSIUM CHLORIDE, SODIUM LACTATE AND CALCIUM CHLORIDE 500 ML: 600; 310; 30; 20 INJECTION, SOLUTION INTRAVENOUS at 18:30

## 2019-07-29 ASSESSMENT — PAIN SCALES - PAIN ASSESSMENT IN ADVANCED DEMENTIA (PAINAD)
BODYLANGUAGE: 0
FACIALEXPRESSION: 0
FACIALEXPRESSION: 0
BREATHING: 0
NEGVOCALIZATION: 0
BREATHING: 0
BREATHING: 0
CONSOLABILITY: 0
BODYLANGUAGE: 0
BODYLANGUAGE: 0
FACIALEXPRESSION: 0
NEGVOCALIZATION: 0
TOTALSCORE: 0
NEGVOCALIZATION: 0
CONSOLABILITY: 0
CONSOLABILITY: 0
TOTALSCORE: 0
TOTALSCORE: 0

## 2019-07-29 NOTE — ED PROVIDER NOTES
[address]          EM RESIDENT NOTE       Date of evaluation: 7/29/2019    Chief Complaint     Altered Mental Status      History of Present Illness     [address] Flo Lady is a 80 y.o. female who presents to ED for evaluation of cyanosis and altered mental status. Family at bedside is unsure what happened to the patient-last saw her 1 week ago which she was behaving normally. At baseline she is sparsely communicative but is very active at her nursing facility and uses a wheelchair to get around. Patient is unable to provide any history-she will will mildly attend but does not answer any questions. Spoke with patient's nursing supervisor at her facility and reports that while the patient was sitting up in her wheelchair at lunch she suddenly became very unresponsive and turned blue. She there was some shaking activity when this started and then she was limp. They confirmed that there was no obvious object in her airway and the patient was not eating at the time that this occurred. They report then that she was thought to be in a deep sleep with otherwise stable vital signs this lasted for approximately 30 minutes prior to when EMS arrived and brought her to the hospital.  There is no other history available. She is otherwise been healthy. No evidence of recent infection. Review of Systems     Review of Systems   Unable to assess 2/2 AMS    Past Medical, Surgical, Family, and Social History     She has a past medical history of Atrial fibrillation ([address] Atrial fibrillation ([address] Dyspnea, Hereditary and idiopathic neuropathy, Hydrocephalus, Hyperlipidemia, Hypertension, Localized edema, Muscle weakness (generalized), Syncope and collapse, Thyroid disease, Unspecified kidney failure, and UTI (urinary tract infection). She has a past surgical history that includes Hysterectomy;  Cholecystectomy; csf shunt; and pr gregory hole infratentor, explore (Bilateral, patient. This patient was also evaluated by the attending physician. All care plans werediscussed and agreed upon. Clinical Impression     1. Altered mental status, unspecified altered mental status type    2. Urinary tract infection with hematuria, site unspecified    3. Myoclonic jerking        Disposition     PATIENT REFERRED TO:  No follow-up provider specified.     DISCHARGE MEDICATIONS:  New Prescriptions    No medications on file       DISPOSITION Decision To Admit 07/29/2019 07:32:56 Karol Schwarz MD  Resident  07/29/19 9171

## 2019-07-30 ENCOUNTER — APPOINTMENT (OUTPATIENT)
Dept: GENERAL RADIOLOGY | Age: 84
DRG: 100 | End: 2019-07-30
Payer: MEDICARE

## 2019-07-30 LAB
ANTI-XA UNFRAC HEPARIN: 1.64 IU/ML (ref 0.3–0.7)
CORTISOL TOTAL: 7.2 UG/DL
EKG ATRIAL RATE: 47 BPM
EKG ATRIAL RATE: 56 BPM
EKG DIAGNOSIS: NORMAL
EKG DIAGNOSIS: NORMAL
EKG P AXIS: 75 DEGREES
EKG P-R INTERVAL: 304 MS
EKG Q-T INTERVAL: 408 MS
EKG Q-T INTERVAL: 518 MS
EKG QRS DURATION: 78 MS
EKG QRS DURATION: 90 MS
EKG QTC CALCULATION (BAZETT): 424 MS
EKG QTC CALCULATION (BAZETT): 458 MS
EKG R AXIS: -48 DEGREES
EKG R AXIS: -62 DEGREES
EKG T AXIS: 125 DEGREES
EKG T AXIS: 142 DEGREES
EKG VENTRICULAR RATE: 47 BPM
EKG VENTRICULAR RATE: 65 BPM
TROPONIN: 0.15 NG/ML
TROPONIN: 0.16 NG/ML

## 2019-07-30 PROCEDURE — 6370000000 HC RX 637 (ALT 250 FOR IP): Performed by: NURSE PRACTITIONER

## 2019-07-30 PROCEDURE — 82533 TOTAL CORTISOL: CPT

## 2019-07-30 PROCEDURE — 95951 HC EEG MONITOR/VIDEO LESS THAN 24: CPT

## 2019-07-30 PROCEDURE — 6360000002 HC RX W HCPCS: Performed by: STUDENT IN AN ORGANIZED HEALTH CARE EDUCATION/TRAINING PROGRAM

## 2019-07-30 PROCEDURE — 93010 ELECTROCARDIOGRAM REPORT: CPT | Performed by: INTERNAL MEDICINE

## 2019-07-30 PROCEDURE — 6360000002 HC RX W HCPCS: Performed by: NURSE PRACTITIONER

## 2019-07-30 PROCEDURE — 31720 CLEARANCE OF AIRWAYS: CPT

## 2019-07-30 PROCEDURE — 85520 HEPARIN ASSAY: CPT

## 2019-07-30 PROCEDURE — 99221 1ST HOSP IP/OBS SF/LOW 40: CPT | Performed by: NURSE PRACTITIONER

## 2019-07-30 PROCEDURE — 2580000003 HC RX 258: Performed by: STUDENT IN AN ORGANIZED HEALTH CARE EDUCATION/TRAINING PROGRAM

## 2019-07-30 PROCEDURE — 84484 ASSAY OF TROPONIN QUANT: CPT

## 2019-07-30 PROCEDURE — 2060000000 HC ICU INTERMEDIATE R&B

## 2019-07-30 PROCEDURE — 87040 BLOOD CULTURE FOR BACTERIA: CPT

## 2019-07-30 PROCEDURE — 2580000003 HC RX 258: Performed by: NURSE PRACTITIONER

## 2019-07-30 PROCEDURE — 36415 COLL VENOUS BLD VENIPUNCTURE: CPT

## 2019-07-30 PROCEDURE — 94761 N-INVAS EAR/PLS OXIMETRY MLT: CPT

## 2019-07-30 PROCEDURE — 93005 ELECTROCARDIOGRAM TRACING: CPT | Performed by: STUDENT IN AN ORGANIZED HEALTH CARE EDUCATION/TRAINING PROGRAM

## 2019-07-30 PROCEDURE — 71045 X-RAY EXAM CHEST 1 VIEW: CPT

## 2019-07-30 RX ORDER — SCOLOPAMINE TRANSDERMAL SYSTEM 1 MG/1
1 PATCH, EXTENDED RELEASE TRANSDERMAL
Status: CANCELLED | OUTPATIENT
Start: 2019-07-30

## 2019-07-30 RX ORDER — SODIUM CHLORIDE 0.9 % (FLUSH) 0.9 %
10 SYRINGE (ML) INJECTION EVERY 12 HOURS SCHEDULED
Status: CANCELLED | OUTPATIENT
Start: 2019-07-30

## 2019-07-30 RX ORDER — MORPHINE SULFATE 2 MG/ML
2 INJECTION, SOLUTION INTRAMUSCULAR; INTRAVENOUS
Status: DISCONTINUED | OUTPATIENT
Start: 2019-07-30 | End: 2019-07-31 | Stop reason: HOSPADM

## 2019-07-30 RX ORDER — LORAZEPAM 2 MG/ML
2 INJECTION INTRAMUSCULAR EVERY 4 HOURS PRN
Status: DISCONTINUED | OUTPATIENT
Start: 2019-07-30 | End: 2019-07-31 | Stop reason: HOSPADM

## 2019-07-30 RX ORDER — LORAZEPAM 2 MG/ML
2 INJECTION INTRAMUSCULAR EVERY 4 HOURS PRN
Status: CANCELLED | OUTPATIENT
Start: 2019-07-30

## 2019-07-30 RX ORDER — SCOLOPAMINE TRANSDERMAL SYSTEM 1 MG/1
1 PATCH, EXTENDED RELEASE TRANSDERMAL
Status: DISCONTINUED | OUTPATIENT
Start: 2019-07-30 | End: 2019-07-31 | Stop reason: HOSPADM

## 2019-07-30 RX ORDER — FUROSEMIDE 10 MG/ML
40 INJECTION INTRAMUSCULAR; INTRAVENOUS ONCE
Status: COMPLETED | OUTPATIENT
Start: 2019-07-30 | End: 2019-07-30

## 2019-07-30 RX ORDER — ONDANSETRON 2 MG/ML
4 INJECTION INTRAMUSCULAR; INTRAVENOUS EVERY 6 HOURS PRN
Status: CANCELLED | OUTPATIENT
Start: 2019-07-30

## 2019-07-30 RX ORDER — MORPHINE SULFATE 2 MG/ML
2 INJECTION, SOLUTION INTRAMUSCULAR; INTRAVENOUS
Status: CANCELLED | OUTPATIENT
Start: 2019-07-30

## 2019-07-30 RX ORDER — ACETAMINOPHEN 650 MG/1
650 SUPPOSITORY RECTAL EVERY 4 HOURS PRN
Status: CANCELLED | OUTPATIENT
Start: 2019-07-30

## 2019-07-30 RX ORDER — LORAZEPAM 2 MG/ML
2 INJECTION INTRAMUSCULAR ONCE
Status: DISCONTINUED | OUTPATIENT
Start: 2019-07-30 | End: 2019-07-30

## 2019-07-30 RX ORDER — SODIUM CHLORIDE 0.9 % (FLUSH) 0.9 %
10 SYRINGE (ML) INJECTION PRN
Status: CANCELLED | OUTPATIENT
Start: 2019-07-30

## 2019-07-30 RX ORDER — ATROPINE SULFATE 10 MG/ML
2 SOLUTION/ DROPS OPHTHALMIC EVERY 4 HOURS PRN
Status: DISCONTINUED | OUTPATIENT
Start: 2019-07-30 | End: 2019-07-31 | Stop reason: HOSPADM

## 2019-07-30 RX ORDER — LORAZEPAM 2 MG/ML
2 INJECTION INTRAMUSCULAR PRN
Status: CANCELLED | OUTPATIENT
Start: 2019-07-30

## 2019-07-30 RX ORDER — LORAZEPAM 2 MG/ML
2 INJECTION INTRAMUSCULAR PRN
Status: DISCONTINUED | OUTPATIENT
Start: 2019-07-30 | End: 2019-07-31 | Stop reason: HOSPADM

## 2019-07-30 RX ADMIN — MORPHINE SULFATE 2 MG: 2 INJECTION, SOLUTION INTRAMUSCULAR; INTRAVENOUS at 14:47

## 2019-07-30 RX ADMIN — SODIUM CHLORIDE, POTASSIUM CHLORIDE, SODIUM LACTATE AND CALCIUM CHLORIDE: 600; 310; 30; 20 INJECTION, SOLUTION INTRAVENOUS at 01:00

## 2019-07-30 RX ADMIN — LEVETIRACETAM 3000 MG: 100 INJECTION, SOLUTION INTRAVENOUS at 01:30

## 2019-07-30 RX ADMIN — Medication 10 ML: at 20:44

## 2019-07-30 RX ADMIN — ATROPINE SULFATE 2 DROP: 10 SOLUTION/ DROPS OPHTHALMIC at 23:32

## 2019-07-30 RX ADMIN — FUROSEMIDE 40 MG: 10 INJECTION, SOLUTION INTRAMUSCULAR; INTRAVENOUS at 08:22

## 2019-07-30 RX ADMIN — SODIUM CHLORIDE, POTASSIUM CHLORIDE, SODIUM LACTATE AND CALCIUM CHLORIDE 500 ML: 600; 310; 30; 20 INJECTION, SOLUTION INTRAVENOUS at 00:10

## 2019-07-30 RX ADMIN — SODIUM CHLORIDE, POTASSIUM CHLORIDE, SODIUM LACTATE AND CALCIUM CHLORIDE: 600; 310; 30; 20 INJECTION, SOLUTION INTRAVENOUS at 06:48

## 2019-07-30 RX ADMIN — MORPHINE SULFATE 2 MG: 2 INJECTION, SOLUTION INTRAMUSCULAR; INTRAVENOUS at 19:02

## 2019-07-30 RX ADMIN — ATROPINE SULFATE 2 DROP: 10 SOLUTION/ DROPS OPHTHALMIC at 11:26

## 2019-07-30 RX ADMIN — LEVETIRACETAM 1000 MG: 100 INJECTION, SOLUTION INTRAVENOUS at 15:14

## 2019-07-30 RX ADMIN — LORAZEPAM 2 MG: 2 INJECTION INTRAMUSCULAR; INTRAVENOUS at 00:52

## 2019-07-30 RX ADMIN — ATROPINE SULFATE 2 DROP: 10 SOLUTION/ DROPS OPHTHALMIC at 19:02

## 2019-07-30 RX ADMIN — MORPHINE SULFATE 2 MG: 2 INJECTION, SOLUTION INTRAMUSCULAR; INTRAVENOUS at 12:22

## 2019-07-30 RX ADMIN — LORAZEPAM 2 MG: 2 INJECTION INTRAMUSCULAR; INTRAVENOUS at 20:39

## 2019-07-30 RX ADMIN — ATROPINE SULFATE 2 DROP: 10 SOLUTION/ DROPS OPHTHALMIC at 14:30

## 2019-07-30 RX ADMIN — Medication 10 ML: at 11:26

## 2019-07-30 ASSESSMENT — PAIN SCALES - PAIN ASSESSMENT IN ADVANCED DEMENTIA (PAINAD)
CONSOLABILITY: 0
BREATHING: 0
CONSOLABILITY: 0
BODYLANGUAGE: 0
FACIALEXPRESSION: 0
NEGVOCALIZATION: 0
TOTALSCORE: 0
NEGVOCALIZATION: 0
FACIALEXPRESSION: 0
CONSOLABILITY: 0
BREATHING: 0
FACIALEXPRESSION: 0
BODYLANGUAGE: 0
BODYLANGUAGE: 0
NEGVOCALIZATION: 0
CONSOLABILITY: 0
CONSOLABILITY: 0
TOTALSCORE: 0
NEGVOCALIZATION: 0
BODYLANGUAGE: 0
TOTALSCORE: 0
NEGVOCALIZATION: 0
BREATHING: 0
TOTALSCORE: 0
BREATHING: 0
BREATHING: 0
TOTALSCORE: 0
TOTALSCORE: 0
FACIALEXPRESSION: 0
BREATHING: 0
CONSOLABILITY: 0
TOTALSCORE: 0
BODYLANGUAGE: 0
NEGVOCALIZATION: 0
CONSOLABILITY: 0
BREATHING: 0
CONSOLABILITY: 0
NEGVOCALIZATION: 0
TOTALSCORE: 0
FACIALEXPRESSION: 0
FACIALEXPRESSION: 0
BODYLANGUAGE: 0
NEGVOCALIZATION: 0
NEGVOCALIZATION: 0
BREATHING: 0
CONSOLABILITY: 0
FACIALEXPRESSION: 0
NEGVOCALIZATION: 0
BODYLANGUAGE: 0
BREATHING: 0
TOTALSCORE: 0
NEGVOCALIZATION: 0
BODYLANGUAGE: 0
BREATHING: 0
FACIALEXPRESSION: 0
BODYLANGUAGE: 0
TOTALSCORE: 0
CONSOLABILITY: 0
CONSOLABILITY: 0
FACIALEXPRESSION: 0
BODYLANGUAGE: 0
FACIALEXPRESSION: 0
TOTALSCORE: 0
BODYLANGUAGE: 0
BODYLANGUAGE: 0
BREATHING: 0
CONSOLABILITY: 0
TOTALSCORE: 0
NEGVOCALIZATION: 0
BREATHING: 0

## 2019-07-30 ASSESSMENT — PAIN SCALES - GENERAL
PAINLEVEL_OUTOF10: 4
PAINLEVEL_OUTOF10: 0
PAINLEVEL_OUTOF10: 4
PAINLEVEL_OUTOF10: 4

## 2019-07-30 ASSESSMENT — PAIN SCALES - WONG BAKER
WONGBAKER_NUMERICALRESPONSE: 0
WONGBAKER_NUMERICALRESPONSE: 0

## 2019-07-30 NOTE — PROGRESS NOTES
heard.  Bradycardic, difficult to palpate distal pulses in feet   Pulmonary/Chest: No stridor. No respiratory distress. She has no wheezes. She has rales. She exhibits no tenderness. Mildly increased effort, not using intercostals; Coarse rhonchi heard in all lung fields; Unable to appreciate crackles in lung bases since patient recumbent   Abdominal: Soft. Bowel sounds are normal. She exhibits no distension. There is no tenderness. There is no guarding. Musculoskeletal: Normal range of motion. She exhibits edema. She exhibits no tenderness or deformity. Patient has 2+ pitting edema of feet, has bilateral chronic venous stasis changes to legs (red, hyperkeratotic, cracked skin); Chronic venous insufficiency to hands, does not appear to be cyanosis   Neurological: No cranial nerve deficit or sensory deficit. Coordination normal.   Patient groans intermittently; Unresponsive otherwise   Skin: Skin is dry. Capillary refill takes 2 to 3 seconds. No rash noted. She is not diaphoretic. There is erythema. There is pallor. Skin cool to touch over entire body; anterior shins erythematous from venous changes, rest of skin pale   Psychiatric:   Unable to assess           LABS:    CBC:   Recent Labs     07/29/19  1430   WBC 4.8   HGB 12.0   HCT 36.3      MCV 95.8     Renal:    Recent Labs     07/29/19  1430      K 4.2      CO2 23   BUN 21*   CREATININE 1.0   GLUCOSE 118*   CALCIUM 9.1   ANIONGAP 14     Hepatic:   Recent Labs     07/29/19  1430   AST 68*   ALT 45*   BILITOT 0.5   BILIDIR <0.2   PROT 6.4   LABALBU 3.4   ALKPHOS 72     Troponin:   Recent Labs     07/29/19  1745 07/30/19  0018 07/30/19  0500   TROPONINI 0.18* 0.16* 0.15*     BNP: No results for input(s): BNP in the last 72 hours. Lipids: No results for input(s): CHOL, HDL in the last 72 hours.     Invalid input(s): LDLCALCU, TRIGLYCERIDE  ABGs:  No results for input(s): PHART, HSS0LGG, PO2ART, JXI9WGH, BEART, THGBART, L7LWJQCQ, FBT9APD in the last 72 hours. INR:   Recent Labs     07/29/19  1430   INR 1.10     Lactate:   Recent Labs     07/29/19  1436   LACTATE 3.07*     Cultures:  -----------------------------------------------------------------  RAD:   CT HEAD WO CONTRAST   Final Result      Interval complete resolution of previously described mixed attenuation bilateral subdural hematomas      Persistent marked ventricular dilatation showing increase in size since prior study (probably secondary to resolution of the hematomas). Right ventriculostomy catheter in stable position. CTA HEAD W CONTRAST   Final Result      No calcific or fibrofatty plaque at either carotid bifurcation. No significant stenosis      No focal occlusion or high-grade stenosis of intracranial vessels. CTA NECK W CONTRAST   Final Result      No calcific or fibrofatty plaque at either carotid bifurcation. No significant stenosis      No focal occlusion or high-grade stenosis of intracranial vessels. XR CHEST STANDARD (2 VW)   Final Result   1. New bibasilar airspace opacities may be secondary to atelectasis/infiltrate. 2. Mild vascular congestion. MRI Brain WO Contrast    (Results Pending)   XR CHEST PORTABLE    (Results Pending)       Assessment/Plan:     Ms. Ebony Kamara is an unstable WF w/ PMH of Afib, hydrocephalus s/p  shunt, h/o bilateral SDH (resolved), HLD, HTN, thyroid disease who presents w/ an episode of cyanosis and unresponsiveness. She is a hospice patient at her nursing home and is DNR-CC. CT scan of head and CTA showed no acute findings (previous ventriculomegaly from hydrocephalus s/p  shunt). She was found to have elevated troponins w/ no EKG changes, started on a heparin drip. Also found to have UTI, started on rocephin and IVF. Started on cvEEG, seizure noted. Given 2mg ativan, loaded on Keppra. Was scheduled for MRI this AM.  Pt currently hypotensive, bradycardic, hypothermic.   IVF and heparin held, EKG showed sinus rusty w/ 1st degree AV block, CXR showed bilateral pulmonary infiltrates. Started on bare hugger protocol for hypothermia to 91.4F. Ordered stat CT head, cancelled MRI. Reaching out to family for goals of care since pt wanted to be hospice and DNR-CC. New onset seizures  - Seizure noted on cvEEG  - Loaded w/ Keppra  - Given 2mg ativan  - CT head and CTA showed no acute findings outside of chronic ventriculomegaly  - MRI originally scheduled to assess etiology, cancelled w/ current vital instability/code status    AMS - acute metabolic encephalopathy vs postictal state  - Methemoglobin negative  - Morning cortisol wnl  - Hypothermic, hypotensive, bradycardic 7/30 AM  - Started on active external warming  - CXR shows increased bilateral pulmonary infiltrates  - Started on 40mg IV lasix, IVF held  - Stat CT head ordered    NSTEMI  - Trops elevated to 0.18 in ED, have trended down to 0.15  - No EKG changes noted besides sinus bradycardia  - Started on heparin drip, currently held for hypotension, hypothermia    UTI  - Treated w/ vanc/cefepime in ED, discontinued  - Started on rocephin      Code Status: DNR-CC  FEN: NPO  PPX: held  DISPO: hospice    Sergo Victoria MD, PGY-1  07/30/19  8:29 AM    This patient has been staffed and discussed with Анна Richmond MD.     Patient seen and examined, labs and imaging studies reviewed, agree with assessment and plan as outlined above. Continue with current care and plan as outlined above, discussed plan discussed with daughter an d son in law at bedside, continue with discharge plan to hospice, advanced care planning discussed, greater than 35 minutes spent on case over half face to face.      MD Aguila Ocampo

## 2019-07-30 NOTE — PROGRESS NOTES
Pt admitted to 4th floor PCU. Assessments as charted. Pt placed on continuous EEG by EEG tech. Vitals as charted. Pt stiff, some tremor, full body. Bed alarm on, high fall risk precautions in place. [employer] non-recording camera in use. Pt unable to sit or stand at bedside, deferring order for orthostatic BP's, Dr. Mary Bassett aware. Reviewed admission screenings. Will update as needed.

## 2019-07-30 NOTE — H&P
CXR: I have reviewed the CXR with the following interpretation: poor inspiratory effort. EKG:  I have reviewed the EKG with the following interpretation: junctional rhythm, low voltage    CT HEAD WO CONTRAST   Final Result      Interval complete resolution of previously described mixed attenuation bilateral subdural hematomas      Persistent marked ventricular dilatation showing increase in size since prior study (probably secondary to resolution of the hematomas). Right ventriculostomy catheter in stable position. CTA HEAD W CONTRAST   Final Result      No calcific or fibrofatty plaque at either carotid bifurcation. No significant stenosis      No focal occlusion or high-grade stenosis of intracranial vessels. CTA NECK W CONTRAST   Final Result      No calcific or fibrofatty plaque at either carotid bifurcation. No significant stenosis      No focal occlusion or high-grade stenosis of intracranial vessels. XR CHEST STANDARD (2 VW)   Final Result   1. New bibasilar airspace opacities may be secondary to atelectasis/infiltrate. 2. Mild vascular congestion. ASSESSMENT:  Patient Active Hospital Problem List:  1. Syncope and collapse    Assessment: Likely cardiac arrhythmia &/or seizure &/or Airway foreign body. Known AFib, now with troponin elevation. Known NPH s/p  shunt w/ recent bilateral SDH. Head CT w/o SAH, CTA Head/Neck w/o thrombus; however, does have increased ventriculomegaly, which could cause seizure. If this was a seizure, I do not believe the patient is in convulsive status as she was able to respond \"no\", and she tracks me around the room. It is difficult to assess if she is post-ictal as her baseline is poorly verbal.  The patient is hypothermic with low blood pressure, concerning for adrenal insufficiency. DDX includes: vasovagal, orthostatic. Unlikely hypoglycemia, anemia. Presentation not consistent with dissection/AAA, PE. No new medications.  QTc <400.    2.

## 2019-07-30 NOTE — PROGRESS NOTES
4 Eyes Admission Assessment     I agree as the admission nurse that 2 RN's have performed a thorough Head to Toe Skin Assessment on the patient. ALL assessment sites listed below have been assessed on admission. Areas assessed by both nurses:  [x]   Head, Face, and Ears   [x]   Shoulders, Back, and Chest  [x]   Arms, Elbows, and Hands   [x]   Coccyx, Sacrum, and Ischum  [x]   Legs, Feet, and Heels        Does the Patient have Skin Breakdown? Yes a wound was noted on the Admission Assessment and an LDA was Initiated documentation include the Jerilyn-wound, Wound Assessment, Measurements, Dressing Treatment, Drainage, and Color\",   Scattered abrasions bilateral legs. Bilat arms/hands blue, warm. Buttocks intact, old scar on l shoulder, appears to be removed mole.          Artur Prevention initiated:  Yes   Wound Care Orders initiated:  RICK      [facility] nurse consulted for Pressure Injury (Stage 3,4, Unstageable, DTI, NWPT, and Complex wounds):  NA      Nurse 1 eSignature: Electronically signed by Yuki Emerson RN on 7/30/19 at 12:19 AM    **SHARE this note so that the co-signing nurse is able to place an eSignature**    Nurse 2 eSignature: Electronically signed by Jann Somers RN on 7/30/19 at 4:21 AM

## 2019-07-30 NOTE — PROGRESS NOTES
We are continue vitals only once a shift for comfort. Last set of vitals BP (!) 85/50   Pulse (!) 48   Temp (!) 91.8 °F (33.2 °C) (Rectal)   Resp 12   Ht 5' 8\" (1.727 m) Comment: estimated  Wt 183 lb 3.2 oz (83.1 kg)   SpO2 99%   BMI 27.86 kg/m² 4 litters of oxygen for comfort. Patient fingers are cyanotic. Continue to position every 2 hours to keep comfortable, patient did grimace one for pain and gave morphine. Patient having lots of secretions, gave atropine, which did help dry them up. Having to suction frequently. Daughter requesting to keep patient comfortable at this time.

## 2019-07-30 NOTE — CONSULTS
succinate (TOPROL XL) extended release tablet 50 mg, 50 mg, Oral, Daily  senna (SENOKOT) tablet 8.6 mg, 1 tablet, Oral, Daily PRN  [Held by provider] pantoprazole (PROTONIX) tablet 20 mg, 20 mg, Oral, QAM AC  [Held by provider] Mirabegron ER TB24 25 mg, 25 mg, Oral, Daily  sodium chloride flush 0.9 % injection 10 mL, 10 mL, Intravenous, 2 times per day  sodium chloride flush 0.9 % injection 10 mL, 10 mL, Intravenous, PRN  ondansetron (ZOFRAN) injection 4 mg, 4 mg, Intravenous, Q6H PRN  bisacodyl (DULCOLAX) suppository 10 mg, 10 mg, Rectal, Daily PRN  fleet rectal enema 1 enema, 1 enema, Rectal, Daily PRN  acetaminophen (TYLENOL) tablet 650 mg, 650 mg, Oral, Q4H PRN  lactated ringers infusion, , Intravenous, Continuous    Allergies:  Patient has no known allergies. Social History:    Patient currently lives in a nursing home    Review of Systems -   Patient unable to comment. Objective:        PHYSICAL EXAM:    General appearance: appears stated age, cooperative, no distress, pale, slowed mentation and appears to be actively dying  Head: Normocephalic, without obvious abnormality, atraumatic  Lungs: RR shallow, currently on oxygen  Heart: per monitor, RR low, bp low  Abdomen: round  Neurologic: unresponsive    Palliative Performance Scale:  60% ? Ambulation reduced; Significant disease; Can't do hobbies/housework; intake normal   or reduced; occasional assist; LOC full/confusion  50% ? Mainly sit/lie; Extensive disease; Can't do any work; Considerable assist; intake normal  Or reduced; LOC full/confusion  40% ? Mainly in bed; Extensive disease; Mainly assist; intake normal or reduced; occasional assist; LOC full/confusion  30% ? Bed Bound; Extensive disease; Total care; intake reduced; LOC full/confusion  20% ? Bed Bound; Extensive disease; Total care; intake minimal; Drowsy/coma  10% ? Bed Bound; Extensive disease; Total care; Mouth care only; Drowsy/coma  0 ?  Death    PPS: 10%    Vitals:    /63 Care  [phone]

## 2019-07-30 NOTE — CONSULTS
Neurology consult Note    Dr. Ray Mchugh requesting this consult. CC: Seizure    HPI:   Ms. Joaquín Quiros is an 80year old with PMH a-fib, bilateral SDH complicated by hydrocephalus,  shunt in situ who presented with altered mental status from SNF. EEG showed status epilepticus when she was hooked up. Daughter states her mother was in hospice care prior to transportation to [facility] and they wish to keep her DNR-CC.     Past Medical History:   Diagnosis Date    Atrial fibrillation (HCC)     Atrial fibrillation (HCC)     Dyspnea     Hereditary and idiopathic neuropathy     Hydrocephalus     Hyperlipidemia     Hypertension     Localized edema     Muscle weakness (generalized)     Syncope and collapse     Thyroid disease     Unspecified kidney failure     UTI (urinary tract infection)      Past Surgical History:   Procedure Laterality Date    CHOLECYSTECTOMY      CSF SHUNT      HYSTERECTOMY      [place] ALEXIA HOLE INFRATENTOR, EXPLORE Bilateral 11/27/2018    BILATERAL TREPHINE CRANIOTOMIES FOR SUBDURAL EVACUATION performed by Janna Diaz MD at [address] [place] [address]:    Current Facility-Administered Medications:     LORazepam (ATIVAN) injection 2 mg, 2 mg, Intravenous, PRN, Obdulio Solares MD, 2 mg at 07/30/19 0052    [COMPLETED] levETIRAcetam (KEPPRA) 3,000 mg in sodium chloride 0.9 % 200 mL IVPB, 3,000 mg, Intravenous, Once, 3,000 mg at 07/30/19 0130 **FOLLOWED BY** levETIRAcetam (KEPPRA) 1,000 mg in sodium chloride 0.9 % 100 mL IVPB, 1,000 mg, Intravenous, Q12H, Meena Jiménez, APRN - CNP    morphine (PF) injection 2 mg, 2 mg, Intravenous, Q2H PRN **OR** morphine (PF) injection 2 mg, 2 mg, Intravenous, Q2H PRN, IBRAHIMA Santana - CNP    atropine 1 % ophthalmic solution 2 drop, 2 drop, Sublingual, Q4H PRN, IBRAHIMA Santana - CNP    LORazepam (ATIVAN) injection 2 mg, 2 mg, Intravenous, Q4H PRN, IBRAHIMA Pringle - CNP    sodium chloride flush 0.9 % injection 10 mL, 10 bilateral subdural hematomas     Persistent marked ventricular dilatation showing increase in size since prior study (probably secondary to resolution of the hematomas). Right ventriculostomy catheter in stable position. Assessment: 80year old with PMH SDH s/p evacuation and  shunt placement who presented from hospice at the nursing home with seizures. She also appears to be septic with hypothermia, hypotension, and bradycardia. Staying with Hospice is very reasonable.        Plan:  -Please continue keppra and rocephin, as these can be palliative  -Will need to inpatient partner with hospice as she appears too unstable to transfer     IBRAHIMA Finn-CNP  Neurology  [phone]

## 2019-07-31 ENCOUNTER — HOSPITAL ENCOUNTER (INPATIENT)
Age: 84
LOS: 1 days | Discharge: HOSPICE | DRG: 070 | End: 2019-08-01
Attending: INTERNAL MEDICINE | Admitting: INTERNAL MEDICINE
Payer: COMMERCIAL

## 2019-07-31 VITALS
HEIGHT: 68 IN | BODY MASS INDEX: 27.77 KG/M2 | WEIGHT: 183.2 LBS | DIASTOLIC BLOOD PRESSURE: 56 MMHG | SYSTOLIC BLOOD PRESSURE: 82 MMHG | HEART RATE: 67 BPM | RESPIRATION RATE: 19 BRPM | OXYGEN SATURATION: 97 % | TEMPERATURE: 94.3 F

## 2019-07-31 PROCEDURE — 6360000002 HC RX W HCPCS: Performed by: NURSE PRACTITIONER

## 2019-07-31 PROCEDURE — 2700000000 HC OXYGEN THERAPY PER DAY

## 2019-07-31 PROCEDURE — 94761 N-INVAS EAR/PLS OXIMETRY MLT: CPT

## 2019-07-31 PROCEDURE — 2580000003 HC RX 258: Performed by: STUDENT IN AN ORGANIZED HEALTH CARE EDUCATION/TRAINING PROGRAM

## 2019-07-31 PROCEDURE — 1250000000 HC SEMI PRIVATE HOSPICE R&B

## 2019-07-31 PROCEDURE — 2580000003 HC RX 258: Performed by: NURSE PRACTITIONER

## 2019-07-31 RX ORDER — ONDANSETRON 2 MG/ML
4 INJECTION INTRAMUSCULAR; INTRAVENOUS EVERY 6 HOURS PRN
Status: DISCONTINUED | OUTPATIENT
Start: 2019-07-31 | End: 2019-08-01 | Stop reason: HOSPADM

## 2019-07-31 RX ORDER — MORPHINE SULFATE 2 MG/ML
2 INJECTION, SOLUTION INTRAMUSCULAR; INTRAVENOUS
Status: DISCONTINUED | OUTPATIENT
Start: 2019-07-31 | End: 2019-08-01 | Stop reason: HOSPADM

## 2019-07-31 RX ORDER — ACETAMINOPHEN 650 MG/1
650 SUPPOSITORY RECTAL EVERY 4 HOURS PRN
Status: DISCONTINUED | OUTPATIENT
Start: 2019-07-31 | End: 2019-08-01 | Stop reason: HOSPADM

## 2019-07-31 RX ORDER — SODIUM CHLORIDE 0.9 % (FLUSH) 0.9 %
10 SYRINGE (ML) INJECTION PRN
Status: DISCONTINUED | OUTPATIENT
Start: 2019-07-31 | End: 2019-08-01 | Stop reason: HOSPADM

## 2019-07-31 RX ORDER — SCOLOPAMINE TRANSDERMAL SYSTEM 1 MG/1
1 PATCH, EXTENDED RELEASE TRANSDERMAL
Status: DISCONTINUED | OUTPATIENT
Start: 2019-08-02 | End: 2019-08-01 | Stop reason: HOSPADM

## 2019-07-31 RX ORDER — LORAZEPAM 2 MG/ML
2 INJECTION INTRAMUSCULAR PRN
Status: DISCONTINUED | OUTPATIENT
Start: 2019-07-31 | End: 2019-08-01 | Stop reason: HOSPADM

## 2019-07-31 RX ORDER — MORPHINE SULFATE 4 MG/ML
4 INJECTION, SOLUTION INTRAMUSCULAR; INTRAVENOUS
Status: DISCONTINUED | OUTPATIENT
Start: 2019-07-31 | End: 2019-08-01 | Stop reason: HOSPADM

## 2019-07-31 RX ORDER — MORPHINE SULFATE 2 MG/ML
2 INJECTION, SOLUTION INTRAMUSCULAR; INTRAVENOUS
Status: DISCONTINUED | OUTPATIENT
Start: 2019-07-31 | End: 2019-07-31

## 2019-07-31 RX ORDER — LORAZEPAM 2 MG/ML
2 INJECTION INTRAMUSCULAR EVERY 4 HOURS PRN
Status: DISCONTINUED | OUTPATIENT
Start: 2019-07-31 | End: 2019-08-01 | Stop reason: HOSPADM

## 2019-07-31 RX ORDER — SODIUM CHLORIDE 0.9 % (FLUSH) 0.9 %
10 SYRINGE (ML) INJECTION EVERY 12 HOURS SCHEDULED
Status: DISCONTINUED | OUTPATIENT
Start: 2019-07-31 | End: 2019-08-01 | Stop reason: HOSPADM

## 2019-07-31 RX ADMIN — ATROPINE SULFATE 2 DROP: 10 SOLUTION/ DROPS OPHTHALMIC at 10:59

## 2019-07-31 RX ADMIN — Medication 10 ML: at 08:53

## 2019-07-31 RX ADMIN — LEVETIRACETAM 1000 MG: 100 INJECTION, SOLUTION INTRAVENOUS at 02:07

## 2019-07-31 RX ADMIN — Medication 10 ML: at 21:05

## 2019-07-31 ASSESSMENT — PAIN SCALES - PAIN ASSESSMENT IN ADVANCED DEMENTIA (PAINAD)
FACIALEXPRESSION: 0
TOTALSCORE: 0
CONSOLABILITY: 0
BODYLANGUAGE: 0
BREATHING: 0
TOTALSCORE: 0
TOTALSCORE: 0
CONSOLABILITY: 0
FACIALEXPRESSION: 0
FACIALEXPRESSION: 0
NEGVOCALIZATION: 0
TOTALSCORE: 0
NEGVOCALIZATION: 0
NEGVOCALIZATION: 0
BODYLANGUAGE: 0
CONSOLABILITY: 0
BREATHING: 0
BODYLANGUAGE: 0
NEGVOCALIZATION: 0
BREATHING: 0
NEGVOCALIZATION: 0
CONSOLABILITY: 0
NEGVOCALIZATION: 0
CONSOLABILITY: 0
FACIALEXPRESSION: 0
CONSOLABILITY: 0
BREATHING: 0
BODYLANGUAGE: 0
FACIALEXPRESSION: 0
BREATHING: 0
CONSOLABILITY: 0
FACIALEXPRESSION: 0
FACIALEXPRESSION: 0
CONSOLABILITY: 0
BREATHING: 0
BREATHING: 0
NEGVOCALIZATION: 0
CONSOLABILITY: 0
TOTALSCORE: 0
NEGVOCALIZATION: 0
BODYLANGUAGE: 0
BREATHING: 0
BODYLANGUAGE: 0
BODYLANGUAGE: 0
NEGVOCALIZATION: 0
BODYLANGUAGE: 0
TOTALSCORE: 0
BREATHING: 0
BREATHING: 0
NEGVOCALIZATION: 0
TOTALSCORE: 0
CONSOLABILITY: 0
FACIALEXPRESSION: 0
TOTALSCORE: 0
FACIALEXPRESSION: 0
TOTALSCORE: 0
FACIALEXPRESSION: 0
TOTALSCORE: 0
CONSOLABILITY: 0
CONSOLABILITY: 0
FACIALEXPRESSION: 0
FACIALEXPRESSION: 0
BODYLANGUAGE: 0
BODYLANGUAGE: 0
CONSOLABILITY: 0
BREATHING: 0
FACIALEXPRESSION: 0
BREATHING: 0
FACIALEXPRESSION: 0
BODYLANGUAGE: 0
TOTALSCORE: 0
BODYLANGUAGE: 0
CONSOLABILITY: 0
BREATHING: 0
BREATHING: 0
BODYLANGUAGE: 0
TOTALSCORE: 0
NEGVOCALIZATION: 0
BODYLANGUAGE: 0
NEGVOCALIZATION: 0
FACIALEXPRESSION: 0
TOTALSCORE: 0
BREATHING: 0
CONSOLABILITY: 0
BODYLANGUAGE: 0

## 2019-07-31 ASSESSMENT — PAIN SCALES - WONG BAKER: WONGBAKER_NUMERICALRESPONSE: 0

## 2019-07-31 ASSESSMENT — PAIN SCALES - GENERAL: PAINLEVEL_OUTOF10: 0

## 2019-07-31 NOTE — PLAN OF CARE
Problem: Falls - Risk of:  Goal: Will remain free from falls  Description  Will remain free from falls  7/31/2019 0607 by Ken Ryan RN  Outcome: Ongoing     Problem: Risk for Impaired Skin Integrity  Goal: Tissue integrity - skin and mucous membranes  Description  Structural intactness and normal physiological function of skin and  mucous membranes.   7/31/2019 0607 by Ken Ryan RN  Outcome: Ongoing    Problem: Breathing Pattern - Ineffective:  Goal: Able to breathe comfortably  Description  Able to breathe comfortably  7/31/2019 0607 by Ken Ryan RN  Outcome: Ongoing

## 2019-07-31 NOTE — PROGRESS NOTES
changes, started on a heparin drip. Also found to have UTI, started on rocephin and IVF. Started on cvEEG, seizure noted. Given 2mg ativan, loaded on Keppra. Was scheduled for MRI this AM.  Pt currently hypotensive, bradycardic, hypothermic. IVF and heparin held, EKG showed sinus rusty w/ 1st degree AV block, CXR showed bilateral pulmonary infiltrates. Started on bare hugger protocol for hypothermia to 91.4F. Ordered stat CT head, cancelled MRI. Reaching out to family for goals of care since pt wanted to be hospice and DNR-CC.     New onset seizures  - Seizure noted on cvEEG  - Loaded w/ Keppra  - Given 2mg ativan  - CT head and CTA showed no acute findings outside of chronic ventriculomegaly  - MRI originally scheduled to assess etiology, cancelled w/ current vital instability/code status     AMS - acute metabolic encephalopathy vs postictal state  - Methemoglobin negative  - Morning cortisol wnl  - Hypothermic, hypotensive, bradycardic 7/30 AM  - Started on active external warming  - CXR shows increased bilateral pulmonary infiltrates  - Started on 40mg IV lasix, IVF held  - Stat CT head ordered and reviewed     NSTEMI  - Trops elevated to 0.18 in ED, have trended down to 0.15  - No EKG changes noted besides sinus bradycardia  - Started on heparin drip, currently held for hypotension, hypothermia mnitor temps     UTI  - Treated w/ vanc/cefepime in ED, discontinued     discussed with patients daughter again at bedside discussed with hospice liason, discussed plan to initiate hospice care, discussed with palliative care consultant.       Code Status: DNR-CC  FEN: NPO  PPX: held  DISPO: hospice      The patient and / or the family were informed of the results of any tests, a time was given to answer questions, a plan was proposed and they agreed with plan.   Disposition: hospice care initiated  [facility]      Jose Hernandez MD FACP

## 2019-08-01 VITALS
OXYGEN SATURATION: 100 % | TEMPERATURE: 96.2 F | HEART RATE: 79 BPM | DIASTOLIC BLOOD PRESSURE: 68 MMHG | SYSTOLIC BLOOD PRESSURE: 92 MMHG | HEIGHT: 68 IN | BODY MASS INDEX: 26.76 KG/M2 | RESPIRATION RATE: 14 BRPM | WEIGHT: 176.59 LBS

## 2019-08-01 LAB
ORGANISM: ABNORMAL
URINE CULTURE, ROUTINE: ABNORMAL
URINE CULTURE, ROUTINE: ABNORMAL

## 2019-08-01 PROCEDURE — 94761 N-INVAS EAR/PLS OXIMETRY MLT: CPT

## 2019-08-01 PROCEDURE — 6360000002 HC RX W HCPCS: Performed by: NURSE PRACTITIONER

## 2019-08-01 PROCEDURE — 2700000000 HC OXYGEN THERAPY PER DAY

## 2019-08-01 PROCEDURE — 2580000003 HC RX 258: Performed by: STUDENT IN AN ORGANIZED HEALTH CARE EDUCATION/TRAINING PROGRAM

## 2019-08-01 RX ADMIN — MORPHINE SULFATE 4 MG: 4 INJECTION INTRAVENOUS at 09:43

## 2019-08-01 RX ADMIN — Medication 10 ML: at 07:53

## 2019-08-01 ASSESSMENT — PAIN SCALES - PAIN ASSESSMENT IN ADVANCED DEMENTIA (PAINAD)
BODYLANGUAGE: 0
TOTALSCORE: 0
BREATHING: 0
TOTALSCORE: 0
NEGVOCALIZATION: 0
CONSOLABILITY: 0
CONSOLABILITY: 0
FACIALEXPRESSION: 0
FACIALEXPRESSION: 0
BREATHING: 0
NEGVOCALIZATION: 0
BODYLANGUAGE: 0

## 2019-08-01 ASSESSMENT — PAIN SCALES - WONG BAKER
WONGBAKER_NUMERICALRESPONSE: 0
WONGBAKER_NUMERICALRESPONSE: 0

## 2019-08-01 NOTE — DISCHARGE INSTR - COC
Continuity of Care Form    Patient Name: Esau Arias   :  1933  MRN:  [id number]    Admit date:  2019  Discharge date:  ***    Code Status Order: DNR-CC   Advance Directives:     Admitting Physician:  Chelsea Kidd MD  PCP: IBRAHIMA Layne - CNP    Discharging Nurse: [facility] Unit/Room#: 2945/6062-30  Discharging Unit Phone Number: ***    Emergency Contact:   Extended Emergency Contact Information  Primary Emergency Contact: Jamee Villagomez [address] Phone: [phone]  Work Phone: [phone]  Relation: Child    Past Surgical History:  Past Surgical History:   Procedure Laterality Date    CHOLECYSTECTOMY      CSF SHUNT      HYSTERECTOMY      LA ALEXIA HOLE INFRATENTOR, EXPLORE Bilateral 2018    BILATERAL TREPHINE CRANIOTOMIES FOR SUBDURAL EVACUATION performed by Shane Sharma MD at [address]       Immunization History:   Immunization History   Administered Date(s) Administered    Influenza, High Dose (Fluzone 72 yrs and older) 10/01/2018    Pneumococcal Polysaccharide ([id number]) 2005       Active Problems:  Patient Active Problem List   Diagnosis Code    Subdural hematoma ([facility] [address] S06.5X9A    Atrial fibrillation with slow ventricular response (HCC) I48.91    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    Nonintractable headache R51    Confusion R41.0    Generalized weakness R53.1    Encounter for palliative care Z51.5    DNR (do not resuscitate) Z66    Syncope and collapse R55    Generalized pain R52    SOB (shortness of breath) R06.02    Agitation R45.1       Isolation/Infection:   Isolation          Contact        Patient Infection Status     Infection Onset Added Last Indicated Last Indicated By Review Planned Expiration Resolved Resolved By    MDRO (multi-drug resistant organism) 19 Urine culture              Nurse Assessment:  Last Vital Signs: BP 92/68   Pulse 79   Temp 96.2 °F (35.7 °C) (Axillary)

## 2019-08-03 LAB — BLOOD CULTURE, ROUTINE: NORMAL

## 2019-08-04 LAB — CULTURE, BLOOD 2: NORMAL

## 2019-08-24 NOTE — H&P
History and Physical    Admit Date: 7/31/2019    Patient's PCP: Dr. Dileep Mendoza, APRN - CNP    HISTORY OF PRESENT ILLNESS:    This is a very pleasant 80 y.o. female with a history of multiple medical problems presenting with acute encephalopathy admitted to hospice care    Past Medical / Surgical History:    Past Medical History:   Diagnosis Date    Atrial fibrillation ([address]     Atrial fibrillation ([address]     Dyspnea     Hereditary and idiopathic neuropathy     Hydrocephalus     Hyperlipidemia     Hypertension     Localized edema     Muscle weakness (generalized)     Syncope and collapse     Thyroid disease     Unspecified kidney failure     UTI (urinary tract infection)        Past Surgical History:   Procedure Laterality Date    CHOLECYSTECTOMY      CSF SHUNT      HYSTERECTOMY      [place] ALEXIA HOLE INFRATENTOR, EXPLORE Bilateral 11/27/2018    BILATERAL TREPHINE CRANIOTOMIES FOR SUBDURAL EVACUATION performed by Inna Mixon MD at [address]       Medications Prior to Admission:    No current facility-administered medications on file prior to encounter. No current outpatient medications on file prior to encounter. Allergies:  Patient has no known allergies. Social History:   TOBACCO:   reports that she has never smoked. She has never used smokeless tobacco.     ETOH:   reports that she does not drink alcohol. Patient currently lives     Family History:       Family history unknown: Yes       ROS: Review of Systems - . All other systems reviewed and are negative. PHYSICAL EXAM:  BP 92/68   Pulse 79   Temp 96.2 °F (35.7 °C) (Axillary)   Resp 14   Ht 5' 8\" (1.727 m)   Wt 176 lb 9.4 oz (80.1 kg)   SpO2 100%   BMI 26.85 kg/m²     No results for input(s): POCGLU in the last 72 hours. LABS:  No results for input(s): WBC, HGB, HCT, PLT in the last 72 hours.                                                                No results for input(s): NA, K, CL, CO2, BUN, CREATININE,

## 2024-04-02 NOTE — ED PROVIDER NOTES
 HYSTERECTOMY       History reviewed. No pertinent family history. Social History     Social History    Marital status:      Spouse name: N/A    Number of children: N/A    Years of education: N/A     Occupational History    Not on file. Social History Main Topics    Smoking status: Never Smoker    Smokeless tobacco: Never Used    Alcohol use No    Drug use: No    Sexual activity: Not on file     Other Topics Concern    Not on file     Social History Narrative    No narrative on file     No current facility-administered medications for this encounter. Current Outpatient Prescriptions   Medication Sig Dispense Refill    aspirin 81 MG tablet Take 81 mg by mouth daily      calcium carbonate 600 MG TABS tablet Take 2.5 tablets by mouth daily      clopidogrel (PLAVIX) 75 MG tablet Take 75 mg by mouth daily      ertapenem (INVANZ) 1 GM injection Inject 1,000 mg into the muscle every 24 hours Until 11/23/2018 for uti      furosemide (LASIX) 20 MG tablet Take 20 mg by mouth daily      metoprolol succinate (TOPROL XL) 50 MG extended release tablet Take 50 mg by mouth daily      Mirabegron ER 25 MG TB24 Take 25 mg by mouth daily      nystatin (MYCOSTATIN) 374535 UNIT/GM powder Apply topically 4 times daily Apply topically 4 times daily.  levothyroxine (SYNTHROID) 150 MCG tablet Take 150 mcg by mouth Daily      Multiple Vitamins-Minerals (THERAPEUTIC MULTIVITAMIN-MINERALS) tablet Take 1 tablet by mouth daily      atorvastatin (LIPITOR) 20 MG tablet Take 20 mg by mouth daily.  hydrochlorothiazide (HYDRODIURIL) 25 MG tablet Take 25 mg by mouth daily. No Known Allergies    REVIEW OF SYSTEMS  10 systems reviewed, pertinent positives per HPI otherwise noted to be negative. PHYSICAL EXAM  /89   Pulse 72   Temp 98.1 °F (36.7 °C) (Infrared)   Resp 20   Ht 5' 6\" (1.676 m)   Wt 170 lb (77.1 kg)   SpO2 98%   BMI 27.44 kg/m²    GENERAL APPEARANCE: Awake and alert. hypodense. Hyperdensity is seen along the falx and along the bilateral frontal lobes, left greater than right, and bilateral anterior temporal lobes. There is hyperdense subdural hemorrhage layering along the tentorium bilaterally. There is approximately 5 mm of left-to-right shift. The subdural collection in the left frontal region measures approximately 3.7 cm in thickness. At the high right frontoparietal region, collection measures approximately 2.3 cm in thickness. ORBITS: The visualized portion of the orbits demonstrate no acute abnormality. SINUSES: The visualized paranasal sinuses and mastoid air cells demonstrate no acute abnormality. SOFT TISSUES/SKULL:  No acute abnormality of the visualized skull or soft tissues. 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 Approximately 5 mm of left-to-right shift. Neurosurgical evaluation is recommended. Findings were discussed with Dr. Alek Mcgraw at 17:51 on 11/19/2018. ED COURSE/MDM  Patient seen and evaluated. Old records reviewed. Labs and imaging reviewed and results discussed with patient. After initial evaluation, differential diagnostic considerations included: stroke, TIA, intracranial bleed, trauma, infection/sepsis, medication side effect, intoxication/withdrawal, metabolic/electrolyte abnormalities    The patient's ED workup was notable for negative UA here. 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 Ashly Saba. Nonfocal neuro exam here though there is a report of R leg dragging which may have been contributing to her falls. Trop neg, labs otherwise reassuring. I consulted and spoke with Dr. Ashly Saba from neurosurgery at [facility] [place] about the patient's ED history, physical, workup, and course so far. Recommendations from this consultant included transfer to [address] [place] [address]. HOB ~30 deg, bedrest, no seizure ppx. CLINICAL IMPRESSION  1. Altered mental status, unspecified altered mental status type    2.  Bilateral subdural hematomas (HCC) warm and dry

## (undated) DEVICE — COVER LT HNDL BLU PLAS

## (undated) DEVICE — TURNOVER KIT RM INF CTRL TECH

## (undated) DEVICE — SUTURE NRLN SZ 4-0 L18IN NONABSORBABLE BLK L13MM TF 1/2 CIR C584D

## (undated) DEVICE — JEWISH CRANI PACK: Brand: MEDLINE INDUSTRIES, INC.

## (undated) DEVICE — SUTURE VCRL SZ 2-0 L18IN ABSRB VLT L26MM SH 1/2 CIR J775D

## (undated) DEVICE — TOOL 10BA50-MN LEGEND 10CM 5MM BA: Brand: MIDAS REX™

## (undated) DEVICE — BIPOLAR CABLE FLYING LEAD 12 FEET (3.7 M): Brand: MEGADYNE

## (undated) DEVICE — SYRINGE MED 10ML TRNSLUC BRL PLUNG BLK MRK POLYPR CTRL

## (undated) DEVICE — MARKER,SKIN,WI/RULER AND LABELS: Brand: MEDLINE

## (undated) DEVICE — SYRINGE CATH TIP 50ML

## (undated) DEVICE — SURGICAL SET UP - SURE SET: Brand: MEDLINE INDUSTRIES, INC.

## (undated) DEVICE — CODMAN® SURGICAL PATTIES 1/2" X 1/2" (1.27CM X 1.27CM): Brand: CODMAN®

## (undated) DEVICE — PROTECTOR UNIV FOAM EXTREMITY DEVON

## (undated) DEVICE — Device

## (undated) DEVICE — STAPLER SKIN H3.9MM WIRE DIA0.58MM CRWN 6.9MM 35 STPL ROT

## (undated) DEVICE — GLOVE SURG SZ 7.5 L11.2IN THK9.8MIL STRW LTX POLYMER BEAD

## (undated) DEVICE — SKIN AFFIX SURG ADHESIVE 72/CS 0.55ML: Brand: MEDLINE

## (undated) DEVICE — BAG DRNGE 9OZ L9.5IN BILE W/ ADPT TWO ADJ RUB BELT DISP FOR

## (undated) DEVICE — 3M™ WARMING BLANKET, LOWER BODY, 10 PER CASE, 42568: Brand: BAIR HUGGER™

## (undated) DEVICE — CHLORAPREP 26ML ORANGE

## (undated) DEVICE — SUTURE MCRYL SZ 4-0 L27IN ABSRB UD L19MM PS-2 1/2 CIR PRIM Y426H

## (undated) DEVICE — ELECTRODE PT RET AD L9FT HI MOIST COND ADH HYDRGEL CORDED

## (undated) DEVICE — BLADE CLIPPER SURG SENSICLIP

## (undated) DEVICE — DRAIN SURG FLAT W7MMXL20CM FULL PERF

## (undated) DEVICE — TOOL F2/8TA23 LEGEND 8CM 2.3MM TAPER: Brand: MIDAS REX™

## (undated) DEVICE — SOLUTION IV 1000ML 0.9% SOD CHL

## (undated) DEVICE — BLADE ES ELASTOMERIC COAT INSUL DURABLE BEND UPTO 90DEG

## (undated) DEVICE — 3L THIN WALL CAN: Brand: CRD

## (undated) DEVICE — SURE SET-DOUBLE BASIN-LF: Brand: MEDLINE INDUSTRIES, INC.

## (undated) DEVICE — COVER,TABLE,HEAVY DUTY,77"X90",STRL: Brand: MEDLINE